# Patient Record
Sex: MALE | Race: WHITE | Employment: FULL TIME | URBAN - METROPOLITAN AREA
[De-identification: names, ages, dates, MRNs, and addresses within clinical notes are randomized per-mention and may not be internally consistent; named-entity substitution may affect disease eponyms.]

---

## 2018-10-25 ENCOUNTER — OFFICE VISIT (OUTPATIENT)
Dept: INTERNAL MEDICINE CLINIC | Facility: CLINIC | Age: 33
End: 2018-10-25
Payer: COMMERCIAL

## 2018-10-25 DIAGNOSIS — L70.9 ACNE, UNSPECIFIED ACNE TYPE: ICD-10-CM

## 2018-10-25 DIAGNOSIS — R53.83 LACK OF ENERGY: ICD-10-CM

## 2018-10-25 DIAGNOSIS — N52.9 ERECTILE DYSFUNCTION, UNSPECIFIED ERECTILE DYSFUNCTION TYPE: Primary | ICD-10-CM

## 2018-10-25 DIAGNOSIS — E66.01 MORBID OBESITY (HCC): ICD-10-CM

## 2018-10-25 DIAGNOSIS — R45.86 MOOD SWINGS: ICD-10-CM

## 2018-10-25 DIAGNOSIS — Z00.00 PREVENTATIVE HEALTH CARE: ICD-10-CM

## 2018-10-25 PROCEDURE — 99204 OFFICE O/P NEW MOD 45 MIN: CPT | Performed by: INTERNAL MEDICINE

## 2018-10-25 PROCEDURE — 90471 IMMUNIZATION ADMIN: CPT | Performed by: INTERNAL MEDICINE

## 2018-10-25 PROCEDURE — 90686 IIV4 VACC NO PRSV 0.5 ML IM: CPT | Performed by: INTERNAL MEDICINE

## 2018-10-25 NOTE — PATIENT INSTRUCTIONS
- Get blood work done.   It needs to be done at 8 AM.  You should be fasting (at least 8 hours, water and medications only)  - Follow up with Dermatology for your acne, facial issues, chest lesion.  - Based on lab results, we may consider starting you on me

## 2018-10-25 NOTE — PROGRESS NOTES
Jeffry Bacon is a 34year old male. HPI:   Patient presents with:  Erectile Dysfuntion: ongoin   Acne: on thighs  Patient is a new patient, here to establish care. Chronic issues:  He has been dealing with erectile dysfunction. It is a chronic issue. smoked. he has never used smokeless tobacco. He reports that he drinks alcohol. He reports that he does not use drugs. Wt Readings from Last 6 Encounters:  10/25/18 : (!) 383 lb 8 oz  11/30/13 : (!) 362 lb 9.6 oz    EXAM:   /80   Pulse 96   Temp 97. display  The patient indicates understanding of these issues and agrees to the plan. Return to clinic time dependent on lab results.     Jenny Holloway MD

## 2018-10-26 VITALS
DIASTOLIC BLOOD PRESSURE: 80 MMHG | HEART RATE: 96 BPM | RESPIRATION RATE: 20 BRPM | SYSTOLIC BLOOD PRESSURE: 120 MMHG | TEMPERATURE: 98 F | WEIGHT: 315 LBS | HEIGHT: 74 IN | BODY MASS INDEX: 40.43 KG/M2

## 2018-10-26 PROBLEM — N20.0 KIDNEY STONE: Status: ACTIVE | Noted: 2018-05-22

## 2018-10-26 PROBLEM — L70.9 ACNE: Status: ACTIVE | Noted: 2018-10-26

## 2018-10-26 PROBLEM — N20.0 KIDNEY STONE: Status: RESOLVED | Noted: 2018-05-22 | Resolved: 2018-10-26

## 2018-10-26 PROBLEM — E66.01 MORBID OBESITY (HCC): Status: ACTIVE | Noted: 2018-10-26

## 2018-10-26 PROBLEM — N52.9 ERECTILE DYSFUNCTION: Status: ACTIVE | Noted: 2018-10-26

## 2018-10-27 ENCOUNTER — LAB ENCOUNTER (OUTPATIENT)
Dept: LAB | Age: 33
End: 2018-10-27
Attending: INTERNAL MEDICINE
Payer: COMMERCIAL

## 2018-10-27 DIAGNOSIS — R53.83 LACK OF ENERGY: ICD-10-CM

## 2018-10-27 DIAGNOSIS — N52.9 ERECTILE DYSFUNCTION, UNSPECIFIED ERECTILE DYSFUNCTION TYPE: ICD-10-CM

## 2018-10-27 DIAGNOSIS — Z00.00 PREVENTATIVE HEALTH CARE: ICD-10-CM

## 2018-10-27 DIAGNOSIS — R45.86 MOOD SWINGS: ICD-10-CM

## 2018-10-27 PROCEDURE — 84402 ASSAY OF FREE TESTOSTERONE: CPT | Performed by: INTERNAL MEDICINE

## 2018-10-27 PROCEDURE — 36415 COLL VENOUS BLD VENIPUNCTURE: CPT | Performed by: INTERNAL MEDICINE

## 2018-10-27 PROCEDURE — 84403 ASSAY OF TOTAL TESTOSTERONE: CPT | Performed by: INTERNAL MEDICINE

## 2018-10-27 PROCEDURE — 80061 LIPID PANEL: CPT | Performed by: INTERNAL MEDICINE

## 2018-10-27 PROCEDURE — 82306 VITAMIN D 25 HYDROXY: CPT | Performed by: INTERNAL MEDICINE

## 2018-10-27 PROCEDURE — 83036 HEMOGLOBIN GLYCOSYLATED A1C: CPT | Performed by: INTERNAL MEDICINE

## 2018-10-27 PROCEDURE — 80050 GENERAL HEALTH PANEL: CPT | Performed by: INTERNAL MEDICINE

## 2018-10-29 DIAGNOSIS — E55.9 VITAMIN D DEFICIENCY: Primary | ICD-10-CM

## 2018-10-29 RX ORDER — ERGOCALCIFEROL 1.25 MG/1
50000 CAPSULE ORAL WEEKLY
Qty: 12 CAPSULE | Refills: 1 | Status: SHIPPED | OUTPATIENT
Start: 2018-10-29 | End: 2019-01-05

## 2018-11-02 ENCOUNTER — PATIENT MESSAGE (OUTPATIENT)
Dept: INTERNAL MEDICINE CLINIC | Facility: CLINIC | Age: 33
End: 2018-11-02

## 2018-11-02 NOTE — TELEPHONE ENCOUNTER
From: Mckay Tian  To: Harjinder Maguire MD  Sent: 11/2/2018 12:58 PM CDT  Subject: Prescription Question    I have a question about TESTOSTERONE,TOTAL AND FREE MALE resulted on 11/1/18 at 1:07 AM.    Can we have this script sent to the [address] [place] [address]

## 2018-11-05 ENCOUNTER — TELEPHONE (OUTPATIENT)
Dept: INTERNAL MEDICINE CLINIC | Facility: CLINIC | Age: 33
End: 2018-11-05

## 2018-11-05 RX ORDER — SILDENAFIL CITRATE 20 MG/1
20 TABLET ORAL DAILY PRN
Qty: 90 TABLET | Refills: 1 | Status: SHIPPED | OUTPATIENT
Start: 2018-11-05 | End: 2021-01-08 | Stop reason: ALTCHOICE

## 2018-11-05 NOTE — TELEPHONE ENCOUNTER
Rx given for Sildenafil Citrate 1 tablet daily as needed. Pt stated he discussed starting on 40 mg daily and if needed increase to 3 tablets daily. Is this correct?

## 2018-12-01 ENCOUNTER — OFFICE VISIT (OUTPATIENT)
Dept: INTERNAL MEDICINE CLINIC | Facility: CLINIC | Age: 33
End: 2018-12-01
Payer: COMMERCIAL

## 2018-12-01 VITALS
DIASTOLIC BLOOD PRESSURE: 88 MMHG | SYSTOLIC BLOOD PRESSURE: 138 MMHG | RESPIRATION RATE: 16 BRPM | BODY MASS INDEX: 39.17 KG/M2 | HEART RATE: 88 BPM | WEIGHT: 315 LBS | HEIGHT: 75 IN

## 2018-12-01 DIAGNOSIS — R53.83 LACK OF ENERGY: ICD-10-CM

## 2018-12-01 DIAGNOSIS — R94.31 ABNORMAL EKG: ICD-10-CM

## 2018-12-01 DIAGNOSIS — E66.01 MORBID OBESITY WITH BMI OF 45.0-49.9, ADULT (HCC): ICD-10-CM

## 2018-12-01 DIAGNOSIS — Z87.442 HISTORY OF KIDNEY STONES: ICD-10-CM

## 2018-12-01 DIAGNOSIS — E55.9 VITAMIN D DEFICIENCY: ICD-10-CM

## 2018-12-01 DIAGNOSIS — Z51.81 THERAPEUTIC DRUG MONITORING: Primary | ICD-10-CM

## 2018-12-01 DIAGNOSIS — E66.01 MORBID OBESITY (HCC): ICD-10-CM

## 2018-12-01 DIAGNOSIS — R45.86 MOOD SWINGS: ICD-10-CM

## 2018-12-01 PROCEDURE — 93000 ELECTROCARDIOGRAM COMPLETE: CPT | Performed by: NURSE PRACTITIONER

## 2018-12-01 PROCEDURE — 99214 OFFICE O/P EST MOD 30 MIN: CPT | Performed by: NURSE PRACTITIONER

## 2018-12-01 RX ORDER — PEN NEEDLE, DIABETIC 30 GX3/16"
1 NEEDLE, DISPOSABLE MISCELLANEOUS DAILY
Qty: 100 EACH | Refills: 1 | Status: SHIPPED | OUTPATIENT
Start: 2018-12-01 | End: 2019-05-04

## 2018-12-01 RX ORDER — LIRAGLUTIDE 6 MG/ML
1.8 INJECTION SUBCUTANEOUS DAILY
Qty: 3 PEN | Refills: 1 | Status: SHIPPED | OUTPATIENT
Start: 2018-12-01 | End: 2019-01-05

## 2018-12-01 NOTE — PROGRESS NOTES
Ike Maddox is covered by your patient's prescription insurance plan  Based on the information provided, your patient can expect to pay:  $15      Nabil Britt is not covered by the patient's prescription insurance plan at this time

## 2018-12-01 NOTE — PATIENT INSTRUCTIONS
PLAN:  Continue with medications: victoza injectable:  Week 1- 0.6mg  Week 2- 1.2mg  Week 3- 1.8mg (stay at this dose)  Stress ECHO ordered  Go to the lab for blood work  Follow up with me in 1 month  Schedule follow up appointments:  Dietitian/nutritionis home!   3. \"Calm\" or \"Headspace\" which helps with mindfulness, meditation, clarity, sleep, and jovon to your daily life.

## 2018-12-01 NOTE — PROGRESS NOTES
Patient teaching on Victoza performed. Patient demonstrated back, all questions were answered and patient verbalized understanding. Patient gave first injection in the office today.  [address]

## 2018-12-01 NOTE — PROGRESS NOTES
HISTORY OF PRESENT ILLNESS  Patient presents with:  Weight Problem: referred by wife, no previous medication, no previous programs    Zenobia Fry is a 34year old male new to our office today for initiation of medical weight loss program.  Patient presents t negative  Thyroid disease: negative  Renal disease: negative   Kidney stones: + may 2018  Liver disease: negative  Joint-related conditions:negative  Migraines/seizures: negative  Glaucoma: negative  Depression/anxiety: self diagnosed- no meds   Constipati 10/27/2018     10/27/2018    K 4.3 10/27/2018     10/27/2018    CO2 27.0 10/27/2018     Lab Results   Component Value Date     10/27/2018    A1C 5.2 10/27/2018     Lab Results   Component Value Date    CHOLEST 188 10/27/2018    TRIG 93 1 [facility] patient contract. Readiness for Lifestyle change: 6/10, Interest in Medication: 8/10, Surgery interest: 4/10. Counseled on comprehensive weight loss plan including attention to nutrition, exercise and behavior/stress management for success.  Discusse Do not eat late at night  · FITTE: ACSM recommendations (150-300 minutes/ week in active weight loss)   · Discussed the role of sleep and stress in weight management  · Weight Loss Contract reviewed and signed today 12/1/2018    Labs ordered today: shona le Look at nutrition section-- \"nutrients\" and it will break down your macros for the day (ie. Protein, carbs, fibers, sugars and fats). Try to stay within these numbers daily     2.  \"7 minute workout\" to help with exercise/activity which takes 7 minutes

## 2018-12-04 ENCOUNTER — LAB ENCOUNTER (OUTPATIENT)
Dept: LAB | Age: 33
End: 2018-12-04
Attending: NURSE PRACTITIONER
Payer: COMMERCIAL

## 2018-12-04 DIAGNOSIS — Z51.81 THERAPEUTIC DRUG MONITORING: ICD-10-CM

## 2018-12-04 DIAGNOSIS — E66.01 MORBID OBESITY WITH BMI OF 45.0-49.9, ADULT (HCC): ICD-10-CM

## 2018-12-04 PROCEDURE — 36415 COLL VENOUS BLD VENIPUNCTURE: CPT | Performed by: NURSE PRACTITIONER

## 2018-12-04 PROCEDURE — 82607 VITAMIN B-12: CPT | Performed by: NURSE PRACTITIONER

## 2018-12-05 ENCOUNTER — PATIENT MESSAGE (OUTPATIENT)
Dept: INTERNAL MEDICINE CLINIC | Facility: CLINIC | Age: 33
End: 2018-12-05

## 2018-12-05 DIAGNOSIS — E55.9 VITAMIN D DEFICIENCY: Primary | ICD-10-CM

## 2018-12-05 RX ORDER — ERGOCALCIFEROL 1.25 MG/1
50000 CAPSULE ORAL
Qty: 16 CAPSULE | Refills: 1 | Status: SHIPPED | OUTPATIENT
Start: 2018-12-06 | End: 2021-01-08 | Stop reason: ALTCHOICE

## 2018-12-05 NOTE — TELEPHONE ENCOUNTER
From: Jeffry Bacon  To: JEREMY Perdue  Sent: 12/5/2018 11:30 AM CST  Subject: Prescription Question    I was just checking to see if the vitamin d prescription was updated yet to reflect the change to twice weekly.  Also I have scheduled my echo for

## 2018-12-14 ENCOUNTER — HOSPITAL ENCOUNTER (OUTPATIENT)
Dept: CV DIAGNOSTICS | Facility: HOSPITAL | Age: 33
Discharge: HOME | End: 2018-12-14
Attending: NURSE PRACTITIONER
Payer: COMMERCIAL

## 2018-12-14 DIAGNOSIS — E66.01 MORBID OBESITY WITH BMI OF 45.0-49.9, ADULT (HCC): ICD-10-CM

## 2018-12-14 DIAGNOSIS — Z51.81 THERAPEUTIC DRUG MONITORING: ICD-10-CM

## 2018-12-14 DIAGNOSIS — R94.31 ABNORMAL EKG: ICD-10-CM

## 2018-12-14 PROCEDURE — 93018 CV STRESS TEST I&R ONLY: CPT | Performed by: NURSE PRACTITIONER

## 2018-12-14 PROCEDURE — 93350 STRESS TTE ONLY: CPT | Performed by: NURSE PRACTITIONER

## 2018-12-14 PROCEDURE — 93017 CV STRESS TEST TRACING ONLY: CPT | Performed by: NURSE PRACTITIONER

## 2018-12-18 NOTE — PROGRESS NOTES
Stress ECHO was abnormal. This is something that if I find if abnormal I would recommend following up with cardiologist and would recommend doing further testing. I can recommend Cardiologist (Dr. Stacie Crane) or your PCP can as well. . If so, edy

## 2019-01-05 ENCOUNTER — OFFICE VISIT (OUTPATIENT)
Dept: INTERNAL MEDICINE CLINIC | Facility: CLINIC | Age: 34
End: 2019-01-05
Payer: COMMERCIAL

## 2019-01-05 VITALS
DIASTOLIC BLOOD PRESSURE: 80 MMHG | HEART RATE: 106 BPM | RESPIRATION RATE: 16 BRPM | BODY MASS INDEX: 39.17 KG/M2 | HEIGHT: 75 IN | WEIGHT: 315 LBS | SYSTOLIC BLOOD PRESSURE: 130 MMHG

## 2019-01-05 DIAGNOSIS — Z87.442 HISTORY OF KIDNEY STONES: ICD-10-CM

## 2019-01-05 DIAGNOSIS — Z51.81 THERAPEUTIC DRUG MONITORING: Primary | ICD-10-CM

## 2019-01-05 DIAGNOSIS — E66.01 MORBID OBESITY WITH BMI OF 45.0-49.9, ADULT (HCC): ICD-10-CM

## 2019-01-05 DIAGNOSIS — E55.9 VITAMIN D DEFICIENCY: ICD-10-CM

## 2019-01-05 DIAGNOSIS — E66.01 MORBID OBESITY (HCC): ICD-10-CM

## 2019-01-05 PROCEDURE — 99214 OFFICE O/P EST MOD 30 MIN: CPT | Performed by: NURSE PRACTITIONER

## 2019-01-05 RX ORDER — LIRAGLUTIDE 6 MG/ML
1.8 INJECTION SUBCUTANEOUS DAILY
Qty: 3 PEN | Refills: 2 | Status: SHIPPED | OUTPATIENT
Start: 2019-01-05 | End: 2019-04-08

## 2019-01-05 RX ORDER — METFORMIN HYDROCHLORIDE 750 MG/1
750 TABLET, EXTENDED RELEASE ORAL 2 TIMES DAILY WITH MEALS
Qty: 60 TABLET | Refills: 4 | Status: SHIPPED | OUTPATIENT
Start: 2019-01-05 | End: 2019-07-02

## 2019-01-05 NOTE — PATIENT INSTRUCTIONS
Keep up the great work!! #5 lbs of weight loss!     PLAN:  Continue with medications: victoza 1.8mg and add metformin 750mg 1 tablet in the morning and 1 tablet before dinner  See Cardiologist   Follow up with me in 1 month  Schedule follow up appointments do at home! 3. \"Calm\" or \"Headspace\" which helps with mindfulness, meditation, clarity, sleep, and jovon to your daily life.

## 2019-02-02 ENCOUNTER — HOSPITAL ENCOUNTER (OUTPATIENT)
Age: 34
Discharge: HOME | End: 2019-02-02
Payer: COMMERCIAL

## 2019-02-02 VITALS
BODY MASS INDEX: 39.17 KG/M2 | HEIGHT: 75 IN | WEIGHT: 315 LBS | TEMPERATURE: 98 F | SYSTOLIC BLOOD PRESSURE: 149 MMHG | HEART RATE: 88 BPM | RESPIRATION RATE: 18 BRPM | DIASTOLIC BLOOD PRESSURE: 85 MMHG | OXYGEN SATURATION: 99 %

## 2019-02-02 DIAGNOSIS — K12.2 UVULITIS: Primary | ICD-10-CM

## 2019-02-02 LAB — POCT RAPID STREP: NEGATIVE

## 2019-02-02 PROCEDURE — 87081 CULTURE SCREEN ONLY: CPT | Performed by: NURSE PRACTITIONER

## 2019-02-02 PROCEDURE — 87430 STREP A AG IA: CPT | Performed by: NURSE PRACTITIONER

## 2019-02-02 PROCEDURE — 99214 OFFICE O/P EST MOD 30 MIN: CPT

## 2019-02-02 PROCEDURE — 99204 OFFICE O/P NEW MOD 45 MIN: CPT

## 2019-02-02 RX ORDER — AMOXICILLIN AND CLAVULANATE POTASSIUM 875; 125 MG/1; MG/1
1 TABLET, FILM COATED ORAL 2 TIMES DAILY
Qty: 20 TABLET | Refills: 0 | Status: SHIPPED | OUTPATIENT
Start: 2019-02-02 | End: 2019-02-12

## 2019-02-02 NOTE — ED PROVIDER NOTES
Patient Seen in: [facility]    History   Patient presents with:  Sore Throat    Stated Complaint: sore throat    57-year-old male who presents to the immediate care with complaints of a sore throat.   Patient has had a recent cho SpO2 99 %   O2 Device None (Room air)       Current:/85   Pulse 88   Temp 98 °F (36.7 °C) (Oral)   Resp 18   Ht 190.5 cm (6' 3\")   Wt (!) 167.8 kg   SpO2 99%   BMI 46.25 kg/m²         Physical Exam   Constitutional: He is oriented to person, place were answered at this time.             Disposition and Plan     Clinical Impression:  Uvulitis  (primary encounter diagnosis)    Disposition:  Discharge  2/2/2019 12:15 pm    Follow-up:  Dinah Mendez MD  [address] [place] [address]  [phone]

## 2019-02-09 ENCOUNTER — OFFICE VISIT (OUTPATIENT)
Dept: INTERNAL MEDICINE CLINIC | Facility: CLINIC | Age: 34
End: 2019-02-09
Payer: COMMERCIAL

## 2019-02-09 VITALS
HEART RATE: 88 BPM | HEIGHT: 75 IN | BODY MASS INDEX: 39.17 KG/M2 | WEIGHT: 315 LBS | RESPIRATION RATE: 16 BRPM | SYSTOLIC BLOOD PRESSURE: 128 MMHG | DIASTOLIC BLOOD PRESSURE: 86 MMHG

## 2019-02-09 DIAGNOSIS — Z51.81 THERAPEUTIC DRUG MONITORING: Primary | ICD-10-CM

## 2019-02-09 DIAGNOSIS — F32.89 OTHER DEPRESSION: ICD-10-CM

## 2019-02-09 DIAGNOSIS — E55.9 VITAMIN D DEFICIENCY: ICD-10-CM

## 2019-02-09 DIAGNOSIS — E66.01 MORBID OBESITY (HCC): ICD-10-CM

## 2019-02-09 PROCEDURE — 99213 OFFICE O/P EST LOW 20 MIN: CPT | Performed by: NURSE PRACTITIONER

## 2019-02-09 RX ORDER — FLUOXETINE HYDROCHLORIDE 20 MG/1
20 CAPSULE ORAL DAILY
Qty: 30 CAPSULE | Refills: 1 | Status: SHIPPED | OUTPATIENT
Start: 2019-02-09 | End: 2019-04-08

## 2019-02-09 NOTE — PROGRESS NOTES
HISTORY OF PRESENT ILLNESS  Patient presents with:  Weight Check: lost 6 pounds    Rafia Sands is a 34year old male here for follow up with medical weight loss program for the treatment of overweight, obesity, or morbid obesity.  Patient has lost -#6  lbs meals/week    Social hx and lifestyle reviewed:    Work: desk job [email or web address]   Marital status:    Support: yes  Tobacco use: none  ETOH use:   rare  Supplements: high dose vitamin d   Stress level: 3/10  Sleep hours and integrity:  6-7 Hours pe 10/27/2018     10/27/2018    K 4.3 10/27/2018     10/27/2018    CO2 27.0 10/27/2018     Lab Results   Component Value Date     10/27/2018    A1C 5.2 10/27/2018     Lab Results   Component Value Date    CHOLEST 188 10/27/2018    TRIG 93 1 Reviewed  [facility] patient contract. Readiness for Lifestyle change: 6/10, Interest in Medication: 8/10, Surgery interest: 4/10.     Counseled on comprehensive weight loss plan including attention to nutrition, exercise and behavior/stress management for suc water 65 oz of water per day  - Do not drink your calories (no soda, juice, high calorie coffee drinks, limit alcohol)  - Do not eat late at night  · FITTE: ACSM recommendations (150-300 minutes/ week in active weight loss)   · Discussed the role of sleep calorie number per day)                   ** Daily INPUT> Look at nutrition section-- \"nutrients\" and it will break down your macros for the day (ie. Protein, carbs, fibers, sugars and fats). Try to stay within these numbers daily     2.  \"7 minute worko

## 2019-02-09 NOTE — PATIENT INSTRUCTIONS
PLAN:  Continue with medications: victoza 1.8mg, fluxeotine  metformin 750mg   Daniel.  For nuclear scan in the next month  Follow up with me in 1 month  Schedule follow up appointments:  Dietitian/nutritionist      Please try to work on the following dietary meditation, clarity, sleep, and jovon to your daily life.

## 2019-02-26 ENCOUNTER — OFFICE VISIT (OUTPATIENT)
Dept: INTERNAL MEDICINE CLINIC | Facility: CLINIC | Age: 34
End: 2019-02-26
Payer: COMMERCIAL

## 2019-02-26 VITALS — WEIGHT: 315 LBS | BODY MASS INDEX: 46 KG/M2

## 2019-02-26 DIAGNOSIS — E66.01 OBESITY, CLASS III, BMI 40-49.9 (MORBID OBESITY) (HCC): Primary | ICD-10-CM

## 2019-02-26 DIAGNOSIS — R74.8 ELEVATED LIVER ENZYMES: ICD-10-CM

## 2019-02-26 PROCEDURE — 97802 MEDICAL NUTRITION INDIV IN: CPT | Performed by: DIETITIAN, REGISTERED

## 2019-02-26 NOTE — PROGRESS NOTES
INITIAL OUTPATIENT NUTRITION CONSULTATION    Nutrition Assessment    Medical Diagnosis: Obesity    Client Age and Gender: 34year old  male  Marital Status and Occupation:  to [facility] [employer] pt.    with [employer], desk job    Problem List as of 2/26/2019 >=130mg/dL         HDL Cholesterol   Date Value Ref Range Status   10/27/2018 49 40 - 59 mg/dL Final     Comment:       Interpretive Information:   An HDL cholesterol <40 mg/dL is low and constitutes a coronary heart disease risk factor.  An HDL choleste variety of foods including vegetables and lean proteins. Eats poor quality foods out of convenience and not prioritizing healthy eating.   He and his wife go to stores with groceries weekly but don't buy food, only home care items, as they know that grocer

## 2019-03-11 ENCOUNTER — OFFICE VISIT (OUTPATIENT)
Dept: INTERNAL MEDICINE CLINIC | Facility: CLINIC | Age: 34
End: 2019-03-11

## 2019-03-11 VITALS
HEART RATE: 90 BPM | SYSTOLIC BLOOD PRESSURE: 132 MMHG | RESPIRATION RATE: 16 BRPM | WEIGHT: 315 LBS | HEIGHT: 75 IN | DIASTOLIC BLOOD PRESSURE: 84 MMHG | BODY MASS INDEX: 39.17 KG/M2

## 2019-03-11 DIAGNOSIS — E66.01 OBESITY, CLASS III, BMI 40-49.9 (MORBID OBESITY) (HCC): ICD-10-CM

## 2019-03-11 DIAGNOSIS — E55.9 VITAMIN D DEFICIENCY: ICD-10-CM

## 2019-03-11 DIAGNOSIS — Z51.81 THERAPEUTIC DRUG MONITORING: Primary | ICD-10-CM

## 2019-03-11 DIAGNOSIS — F32.89 OTHER DEPRESSION: ICD-10-CM

## 2019-03-11 PROCEDURE — 99213 OFFICE O/P EST LOW 20 MIN: CPT | Performed by: NURSE PRACTITIONER

## 2019-03-11 NOTE — PROGRESS NOTES
HISTORY OF PRESENT ILLNESS  Patient presents with:  Weight Check: lost 5 pounds    Lisa Gonzalez is a 34year old male here for follow up with medical weight loss program for the treatment of overweight, obesity, or morbid obesity.  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 Counseled on comprehensive weight loss plan including attention to nutrition, exercise and behavior/stress management for success.  Discussed first goal of weight loss 5% i calories (no soda, juice, high calorie coffee drinks, limit alcohol)  - Do not eat late at night  · FITTE: ACSM recommendations (150-300 minutes/ week in active weight loss)   · Discussed the role of sleep and stress in weight management  · Weight Loss Con break down your macros for the day (ie. Protein, carbs, fibers, sugars and fats). Try to stay within these numbers daily     2. \"7 minute workout\" to help with exercise/activity which takes 7 minutes of your day and that you can do at home! 3.  \"Calm\"

## 2019-03-11 NOTE — PATIENT INSTRUCTIONS
Keep up the great work! !     PLAN:  Continue with medications  Follow up with me in 1 month  Schedule follow up appointments:  Dietitian/nutritionist      Please try to work on the following dietary changes:  1.  Drink lots of water and cut down on soda/jui

## 2019-04-08 ENCOUNTER — OFFICE VISIT (OUTPATIENT)
Dept: INTERNAL MEDICINE CLINIC | Facility: CLINIC | Age: 34
End: 2019-04-08

## 2019-04-08 VITALS
RESPIRATION RATE: 16 BRPM | SYSTOLIC BLOOD PRESSURE: 120 MMHG | BODY MASS INDEX: 39.17 KG/M2 | WEIGHT: 315 LBS | DIASTOLIC BLOOD PRESSURE: 82 MMHG | HEIGHT: 75 IN | HEART RATE: 86 BPM

## 2019-04-08 DIAGNOSIS — F32.89 OTHER DEPRESSION: ICD-10-CM

## 2019-04-08 DIAGNOSIS — E55.9 VITAMIN D DEFICIENCY: ICD-10-CM

## 2019-04-08 DIAGNOSIS — E66.01 MORBID OBESITY (HCC): ICD-10-CM

## 2019-04-08 DIAGNOSIS — R74.8 ELEVATED LIVER ENZYMES: ICD-10-CM

## 2019-04-08 DIAGNOSIS — Z51.81 THERAPEUTIC DRUG MONITORING: Primary | ICD-10-CM

## 2019-04-08 DIAGNOSIS — Z71.3 DIETARY COUNSELING: ICD-10-CM

## 2019-04-08 PROCEDURE — 99213 OFFICE O/P EST LOW 20 MIN: CPT | Performed by: NURSE PRACTITIONER

## 2019-04-08 PROCEDURE — 99401 PREV MED CNSL INDIV APPRX 15: CPT | Performed by: NURSE PRACTITIONER

## 2019-04-08 RX ORDER — FLUOXETINE HYDROCHLORIDE 20 MG/1
20 CAPSULE ORAL DAILY
Qty: 30 CAPSULE | Refills: 1 | Status: CANCELLED | OUTPATIENT
Start: 2019-04-08

## 2019-04-08 RX ORDER — FLUOXETINE HYDROCHLORIDE 40 MG/1
40 CAPSULE ORAL DAILY
Qty: 30 CAPSULE | Refills: 3 | Status: SHIPPED | OUTPATIENT
Start: 2019-04-08 | End: 2019-07-02

## 2019-04-08 RX ORDER — LIRAGLUTIDE 6 MG/ML
1.8 INJECTION SUBCUTANEOUS DAILY
Qty: 3 PEN | Refills: 2 | Status: SHIPPED | OUTPATIENT
Start: 2019-04-08 | End: 2019-07-02

## 2019-04-08 NOTE — PROGRESS NOTES
Preventative Counseling for Diet and Exercise     /82   Pulse 86   Resp 16   Ht 75\"   Wt (!) 360 lb   BMI 45.00 kg/m²   Body mass index is 45 kg/m². Wt Readings from Last 6 Encounters:  04/08/19 : (!) 360 lb  03/11/19 : (!) 360 lb  02/26/19 : Sharri Castro )

## 2019-04-08 NOTE — PATIENT INSTRUCTIONS
PLAN:  Continue with medications:  prozac 40mg, metformin, and victoza 1.8mg  Get nuclear stress echo   Follow up with me in 1 month  Schedule follow up appointments:  Dietitian/nutritionist      Please try to work on the following dietary changes:  1.  Dri sleep, and jovon to your daily life.

## 2019-04-08 NOTE — PROGRESS NOTES
HISTORY OF PRESENT ILLNESS  Patient presents with:  Weight Check: lost 1 pound    Jyoti Huizar is a 34year old male here for follow up with medical weight loss program for the treatment of overweight, obesity, or morbid obesity.  Patient has lost -#1  lbs s disorders:negative  Diabetes: negative  Thyroid disease: negative  Renal disease: negative   Kidney stones: + may 2018  Liver disease: negative  Joint-related conditions:negative  Migraines/seizures: negative  Glaucoma: negative  Depression/anxiety: self d Lab Results   Component Value Date    B12 521 12/04/2018     Lab Results   Component Value Date    VITD 9.9 (L) 10/27/2018         Current Outpatient Medications on File Prior to Visit:  MetFORMIN HCl  MG Oral Tablet 24 Hr Take 1 tablet (750 mg t nl intervals, no acute ST changes, [id number]. abnormal EKG.  No previous EKG to compare- ECHO was abnormal--> referral to see CARDS    PLAN   Continue w/ recommendations from nutritionist (start using MFP)  Start to increase exercising/activity 3 days a week ( stress echo   Follow up with me in 1 month  Schedule follow up appointments:  Dietitian/nutritionist      Please try to work on the following dietary changes:  1. Drink lots of water and cut down on soda/juice consumption if soda/juice drinker  2.  Eat mauricio 5/6/2019). Patient verbalizes understanding.     Terrell York, APRN

## 2019-04-27 ENCOUNTER — HOSPITAL ENCOUNTER (OUTPATIENT)
Dept: CV DIAGNOSTICS | Facility: HOSPITAL | Age: 34
Discharge: HOME | End: 2019-04-27
Attending: INTERNAL MEDICINE
Payer: COMMERCIAL

## 2019-04-27 DIAGNOSIS — E66.01 MORBID OBESITY WITH BMI OF 45.0-49.9, ADULT (HCC): ICD-10-CM

## 2019-04-27 DIAGNOSIS — Z13.220 SCREENING, LIPID: ICD-10-CM

## 2019-04-27 DIAGNOSIS — R94.39 ABNORMAL STRESS ECHO: ICD-10-CM

## 2019-04-27 PROCEDURE — 78452 HT MUSCLE IMAGE SPECT MULT: CPT | Performed by: INTERNAL MEDICINE

## 2019-04-27 PROCEDURE — 93018 CV STRESS TEST I&R ONLY: CPT | Performed by: INTERNAL MEDICINE

## 2019-04-27 PROCEDURE — 93017 CV STRESS TEST TRACING ONLY: CPT | Performed by: INTERNAL MEDICINE

## 2019-05-01 DIAGNOSIS — E55.9 VITAMIN D DEFICIENCY: ICD-10-CM

## 2019-05-02 RX ORDER — ERGOCALCIFEROL 1.25 MG/1
CAPSULE ORAL
Qty: 16 CAPSULE | Refills: 0 | OUTPATIENT
Start: 2019-05-02

## 2019-05-02 NOTE — TELEPHONE ENCOUNTER
Patient informed he needs to get lab done so we can see if he needs Vitamin D to continue. He will go soon.

## 2019-05-02 NOTE — TELEPHONE ENCOUNTER
Please tell patient I put in vitamin d lab and will see if it's too low and and if so we can give more high dose vitamin d

## 2019-05-04 ENCOUNTER — OFFICE VISIT (OUTPATIENT)
Dept: INTERNAL MEDICINE CLINIC | Facility: CLINIC | Age: 34
End: 2019-05-04
Payer: COMMERCIAL

## 2019-05-04 ENCOUNTER — LAB ENCOUNTER (OUTPATIENT)
Dept: LAB | Age: 34
End: 2019-05-04
Attending: NURSE PRACTITIONER
Payer: COMMERCIAL

## 2019-05-04 VITALS
DIASTOLIC BLOOD PRESSURE: 70 MMHG | BODY MASS INDEX: 39.17 KG/M2 | RESPIRATION RATE: 14 BRPM | SYSTOLIC BLOOD PRESSURE: 110 MMHG | HEART RATE: 70 BPM | HEIGHT: 75 IN | WEIGHT: 315 LBS

## 2019-05-04 VITALS — WEIGHT: 315 LBS | BODY MASS INDEX: 45 KG/M2

## 2019-05-04 DIAGNOSIS — F32.89 OTHER DEPRESSION: ICD-10-CM

## 2019-05-04 DIAGNOSIS — E55.9 VITAMIN D DEFICIENCY: ICD-10-CM

## 2019-05-04 DIAGNOSIS — R74.8 ELEVATED LIVER ENZYMES: ICD-10-CM

## 2019-05-04 DIAGNOSIS — Z51.81 THERAPEUTIC DRUG MONITORING: Primary | ICD-10-CM

## 2019-05-04 DIAGNOSIS — E66.01 OBESITY, CLASS III, BMI 40-49.9 (MORBID OBESITY) (HCC): Primary | ICD-10-CM

## 2019-05-04 DIAGNOSIS — E66.01 OBESITY, CLASS III, BMI 40-49.9 (MORBID OBESITY) (HCC): ICD-10-CM

## 2019-05-04 PROCEDURE — 97803 MED NUTRITION INDIV SUBSEQ: CPT | Performed by: DIETITIAN, REGISTERED

## 2019-05-04 PROCEDURE — 82306 VITAMIN D 25 HYDROXY: CPT | Performed by: NURSE PRACTITIONER

## 2019-05-04 PROCEDURE — 99214 OFFICE O/P EST MOD 30 MIN: CPT | Performed by: NURSE PRACTITIONER

## 2019-05-04 RX ORDER — PEN NEEDLE, DIABETIC 30 GX3/16"
1 NEEDLE, DISPOSABLE MISCELLANEOUS DAILY
Qty: 100 EACH | Refills: 1 | Status: SHIPPED | OUTPATIENT
Start: 2019-05-04 | End: 2020-03-28

## 2019-05-04 RX ORDER — PHENTERMINE HYDROCHLORIDE 37.5 MG/1
37.5 TABLET ORAL
Qty: 30 TABLET | Refills: 0 | Status: SHIPPED | OUTPATIENT
Start: 2019-05-04 | End: 2019-07-02

## 2019-05-04 NOTE — PROGRESS NOTES
HISTORY OF PRESENT ILLNESS  Patient presents with:  Weight Check: down Julee Barton is a 30year old male here for follow up with medical weight loss program for the treatment of overweight, obesity, or morbid obesity.  Patient has lost -#4  lbs since L dose vitamin d   Stress level: 3/10  Sleep hours and integrity:  6-7 Hours per night    MEDICAL HISTORY  PMH reviewed:   Cardiac disorders:negative  Diabetes: negative  Thyroid disease: negative  Renal disease: negative   Kidney stones: + may 2018  Liver d  (H) 10/27/2018    VLDL 19 10/27/2018    NONHDLC 139 (H) 10/27/2018     Lab Results   Component Value Date    B12 521 12/04/2018     Lab Results   Component Value Date    VITD 9.9 (L) 10/27/2018         Current Outpatient Medications on File Prior t cardiology Dr. Rahat Schmidt)    PLAN   Continue w/ recommendations from nutritionist (start using MFP)  Start to increase exercising/activity 3 days a week (1 w/  and 2 without) as tolerated   Decrease carbs, increase protein, decrease soda & tea, d lbs of weight loss!      PLAN:  Continue with medications: phentermine 37.5mg (cut 1/2 tablet in half for 4 days) and then take the full dose, metformin, victoza 1.8mg and prozac  Vitamin d lab  Follow up with me in 1 month  Schedule follow up appointments do at home! 3. \"Calm\" or \"Headspace\" which helps with mindfulness, meditation, clarity, sleep, and jovon to your daily life. Return in about 1 month (around 6/4/2019) for weight mangement. Patient verbalizes understanding.     Rich Rhoades

## 2019-05-04 NOTE — PROGRESS NOTES
FOLLOW UP NUTRITION CONSULTATION    Nutrition Assessment    Number of consultations with dietitian: 2    Height:  Ht Readings from Last 1 Encounters:  04/08/19 : 75\"      Weight:   Wt Readings from Last 2 Encounters:  05/04/19 : (!) 356 lb  04/08/19 : suggested. Goals:  · Continue meal planning and grocery shopping  · Have a \"cheat\" food on weekends rather than a cheat day  · Increase exercise      Monitoring/Evaluation: Patient scheduled to return to [facility]. Follow up as needed.

## 2019-05-05 NOTE — PROGRESS NOTES
Vitamin d looks so much better, now within normal range. I would just continue to take over the counter 1,000 iu daily of vitamin d to keep it up.

## 2019-07-02 ENCOUNTER — OFFICE VISIT (OUTPATIENT)
Dept: INTERNAL MEDICINE CLINIC | Facility: CLINIC | Age: 34
End: 2019-07-02

## 2019-07-02 VITALS
WEIGHT: 315 LBS | SYSTOLIC BLOOD PRESSURE: 108 MMHG | BODY MASS INDEX: 39.17 KG/M2 | RESPIRATION RATE: 16 BRPM | DIASTOLIC BLOOD PRESSURE: 72 MMHG | HEART RATE: 90 BPM | HEIGHT: 75 IN

## 2019-07-02 DIAGNOSIS — F32.89 OTHER DEPRESSION: ICD-10-CM

## 2019-07-02 DIAGNOSIS — E66.01 MORBID OBESITY (HCC): ICD-10-CM

## 2019-07-02 DIAGNOSIS — Z51.81 THERAPEUTIC DRUG MONITORING: Primary | ICD-10-CM

## 2019-07-02 DIAGNOSIS — E55.9 VITAMIN D DEFICIENCY: ICD-10-CM

## 2019-07-02 PROCEDURE — 99214 OFFICE O/P EST MOD 30 MIN: CPT | Performed by: NURSE PRACTITIONER

## 2019-07-02 RX ORDER — PHENTERMINE HYDROCHLORIDE 37.5 MG/1
37.5 TABLET ORAL
Qty: 30 TABLET | Refills: 0 | Status: SHIPPED | OUTPATIENT
Start: 2019-07-02 | End: 2021-01-08 | Stop reason: ALTCHOICE

## 2019-07-02 RX ORDER — LIRAGLUTIDE 6 MG/ML
1.8 INJECTION SUBCUTANEOUS DAILY
Qty: 3 PEN | Refills: 2 | Status: SHIPPED | OUTPATIENT
Start: 2019-07-02 | End: 2021-01-08 | Stop reason: ALTCHOICE

## 2019-07-02 NOTE — PROGRESS NOTES
HISTORY OF PRESENT ILLNESS  Patient presents with:  Weight Check: up 13 lbs    Dione Ricci is a 30year old male here for follow up with medical weight loss program for the treatment of overweight, obesity, or morbid obesity.  Patient has gained -#13  lbs s rare  Supplements: high dose vitamin d   Stress level: 3/10  Sleep hours and integrity:  6-7 Hours per night    MEDICAL HISTORY  PMH reviewed:   Cardiac disorders:negative  Diabetes: negative  Thyroid disease: negative  Renal disease: negative   Kidney sto HDL 49 10/27/2018     (H) 10/27/2018    VLDL 19 10/27/2018    NONHDLC 139 (H) 10/27/2018     Lab Results   Component Value Date    B12 521 12/04/2018     Lab Results   Component Value Date    VITD 43.2 05/04/2019         Current Outpatient [facility] cardiology Dr. Tea Keita)    PLAN   Continue w/ recommendations from nutritionist (start using MFP)  Start to increase exercising/activity 3 days a week (1 w/  and 2 without) as tolerated   Decrease carbs, increase protein, decrease soda & tea, d

## 2019-07-02 NOTE — PATIENT INSTRUCTIONS
PLAN:  Continue with medications: phentermine 37.5mg and victoza   Follow up with me in 1 month  Schedule follow up appointments:  Dietitian/nutritionist      Please try to work on the following dietary changes:  1.  Drink lots of water and cut down on soda

## 2020-03-28 ENCOUNTER — E-ADVICE (OUTPATIENT)
Dept: FAMILY MEDICINE | Age: 35
End: 2020-03-28

## 2020-03-28 ENCOUNTER — V-VISIT (OUTPATIENT)
Dept: FAMILY MEDICINE | Age: 35
End: 2020-03-28

## 2020-03-28 ENCOUNTER — APPOINTMENT (OUTPATIENT)
Dept: GENERAL RADIOLOGY | Facility: HOSPITAL | Age: 35
End: 2020-03-28
Attending: PHYSICIAN ASSISTANT
Payer: COMMERCIAL

## 2020-03-28 ENCOUNTER — TELEPHONE (OUTPATIENT)
Dept: INTERNAL MEDICINE CLINIC | Facility: CLINIC | Age: 35
End: 2020-03-28

## 2020-03-28 ENCOUNTER — HOSPITAL ENCOUNTER (EMERGENCY)
Facility: HOSPITAL | Age: 35
Discharge: HOME | End: 2020-03-28
Attending: EMERGENCY MEDICINE
Payer: COMMERCIAL

## 2020-03-28 VITALS
WEIGHT: 315 LBS | HEIGHT: 75 IN | BODY MASS INDEX: 39.17 KG/M2 | TEMPERATURE: 98 F | OXYGEN SATURATION: 99 % | RESPIRATION RATE: 14 BRPM | DIASTOLIC BLOOD PRESSURE: 78 MMHG | HEART RATE: 90 BPM | SYSTOLIC BLOOD PRESSURE: 124 MMHG

## 2020-03-28 DIAGNOSIS — J06.9 VIRAL UPPER RESPIRATORY INFECTION: ICD-10-CM

## 2020-03-28 DIAGNOSIS — Z20.822 EXPOSURE TO COVID-19 VIRUS: Primary | ICD-10-CM

## 2020-03-28 DIAGNOSIS — Z20.822 SUSPECTED 2019 NOVEL CORONAVIRUS INFECTION: Primary | ICD-10-CM

## 2020-03-28 LAB
ALBUMIN SERPL-MCNC: 3.7 G/DL (ref 3.4–5)
ALBUMIN/GLOB SERPL: 0.9 {RATIO} (ref 1–2)
ALP LIVER SERPL-CCNC: 67 U/L (ref 45–117)
ALT SERPL-CCNC: 115 U/L (ref 16–61)
ANION GAP SERPL CALC-SCNC: 4 MMOL/L (ref 0–18)
AST SERPL-CCNC: 66 U/L (ref 15–37)
BASOPHILS # BLD AUTO: 0.03 X10(3) UL (ref 0–0.2)
BASOPHILS NFR BLD AUTO: 0.5 %
BILIRUB SERPL-MCNC: 0.6 MG/DL (ref 0.1–2)
BUN BLD-MCNC: 7 MG/DL (ref 7–18)
BUN/CREAT SERPL: 7.8 (ref 10–20)
CALCIUM BLD-MCNC: 9 MG/DL (ref 8.5–10.1)
CHLORIDE SERPL-SCNC: 108 MMOL/L (ref 98–112)
CO2 SERPL-SCNC: 27 MMOL/L (ref 21–32)
CREAT BLD-MCNC: 0.9 MG/DL (ref 0.7–1.3)
DEPRECATED RDW RBC AUTO: 41.1 FL (ref 35.1–46.3)
EOSINOPHIL # BLD AUTO: 0.03 X10(3) UL (ref 0–0.7)
EOSINOPHIL NFR BLD AUTO: 0.5 %
ERYTHROCYTE [DISTWIDTH] IN BLOOD BY AUTOMATED COUNT: 13.7 % (ref 11–15)
GLOBULIN PLAS-MCNC: 3.9 G/DL (ref 2.8–4.4)
GLUCOSE BLD-MCNC: 134 MG/DL (ref 70–99)
HCT VFR BLD AUTO: 45.2 % (ref 39–53)
HGB BLD-MCNC: 14.9 G/DL (ref 13–17.5)
IMM GRANULOCYTES # BLD AUTO: 0.2 X10(3) UL (ref 0–1)
IMM GRANULOCYTES NFR BLD: 3.6 %
LYMPHOCYTES # BLD AUTO: 1.12 X10(3) UL (ref 1–4)
LYMPHOCYTES NFR BLD AUTO: 20.3 %
M PROTEIN MFR SERPL ELPH: 7.6 G/DL (ref 6.4–8.2)
MCH RBC QN AUTO: 27.4 PG (ref 26–34)
MCHC RBC AUTO-ENTMCNC: 33 G/DL (ref 31–37)
MCV RBC AUTO: 83.2 FL (ref 80–100)
MONOCYTES # BLD AUTO: 0.45 X10(3) UL (ref 0.1–1)
MONOCYTES NFR BLD AUTO: 8.1 %
NEUTROPHILS # BLD AUTO: 3.7 X10 (3) UL (ref 1.5–7.7)
NEUTROPHILS # BLD AUTO: 3.7 X10(3) UL (ref 1.5–7.7)
NEUTROPHILS NFR BLD AUTO: 67 %
OSMOLALITY SERPL CALC.SUM OF ELEC: 288 MOSM/KG (ref 275–295)
PLATELET # BLD AUTO: 201 10(3)UL (ref 150–450)
POTASSIUM SERPL-SCNC: 3.9 MMOL/L (ref 3.5–5.1)
RBC # BLD AUTO: 5.43 X10(6)UL (ref 4.3–5.7)
SODIUM SERPL-SCNC: 139 MMOL/L (ref 136–145)
WBC # BLD AUTO: 5.5 X10(3) UL (ref 4–11)

## 2020-03-28 PROCEDURE — 80053 COMPREHEN METABOLIC PANEL: CPT | Performed by: PHYSICIAN ASSISTANT

## 2020-03-28 PROCEDURE — 96360 HYDRATION IV INFUSION INIT: CPT

## 2020-03-28 PROCEDURE — 99442 TELEPHONE E&M BY PHYSICIAN EST PT NOT ORIG PREV 7 DAYS 11-20 MIN: CPT | Performed by: NURSE PRACTITIONER

## 2020-03-28 PROCEDURE — 85025 COMPLETE CBC W/AUTO DIFF WBC: CPT | Performed by: PHYSICIAN ASSISTANT

## 2020-03-28 PROCEDURE — 99284 EMERGENCY DEPT VISIT MOD MDM: CPT

## 2020-03-28 PROCEDURE — 87999 UNLISTED MICROBIOLOGY PX: CPT

## 2020-03-28 PROCEDURE — 71045 X-RAY EXAM CHEST 1 VIEW: CPT | Performed by: PHYSICIAN ASSISTANT

## 2020-03-28 NOTE — ED PROVIDER NOTES
Patient Seen in: [facility] Emergency Department      History   Patient presents with:  Cough/URI  Dyspnea VIVIANA SOB    Stated Complaint: reports potential positive exposure ~ reports cough, reports occassional SOB (h*    HPI  Sami Lloyd is a 17-year-old m 122/107   Pulse (!) 126   Resp 18   Temp 98.4 °F (36.9 °C)   Temp src Oral   SpO2 97 %   O2 Device None (Room air)       Current:BP (!) 122/107   Pulse (!) 126   Temp 98.4 °F (36.9 °C) (Oral)   Resp 18   Ht 190.5 cm (6' 3\")   Wt (!) 176.9 kg   SpO2 97% Please view results for these tests on the individual orders.    RAINBOW DRAW BLUE   RAINBOW DRAW LAVENDER   RAINBOW DRAW LIGHT GREEN   RAINBOW DRAW GOLD   SARS-COV-2 BY PCR   CBC W/ DIFFERENTIAL          COVID-19 Risk Assessment      COVID-19 Ris control. He is warned of red flag symptoms that should warrant prompt reevaluation. The patient is encouraged to return if any concerning symptoms arise. Additional verbal discharge instructions are given and discussed.   Discharge medications are discuss

## 2020-03-28 NOTE — ED PROVIDER NOTES
27-year-old male presents to the emergency department and was seen by me as well as by the physician assistant for issues with a cough and low-grade fever.   He has a probable exposure to Covid patient does have some comorbidities including hypertension and

## 2020-03-28 NOTE — TELEPHONE ENCOUNTER
Patient called to discuss symptoms. He has been dealing with cough, shortness of breath x 2 days. Works at a [email or web address]. He has co-worker whose mother tested positive for COVID-19. Patient himself with no fevers/chills/sweats. No recent travels.   Dc Vera

## 2020-03-29 LAB — SARS-COV-2 RNA RESP QL NAA+PROBE: NOT DETECTED

## 2021-01-08 ENCOUNTER — HOSPITAL ENCOUNTER (OUTPATIENT)
Age: 36
Discharge: ANOTHER HOSPITAL | End: 2021-01-08
Payer: COMMERCIAL

## 2021-01-08 ENCOUNTER — APPOINTMENT (OUTPATIENT)
Dept: CT IMAGING | Facility: HOSPITAL | Age: 36
End: 2021-01-08
Attending: EMERGENCY MEDICINE
Payer: COMMERCIAL

## 2021-01-08 ENCOUNTER — HOSPITAL ENCOUNTER (EMERGENCY)
Facility: HOSPITAL | Age: 36
Discharge: HOME | End: 2021-01-08
Attending: EMERGENCY MEDICINE
Payer: COMMERCIAL

## 2021-01-08 VITALS
OXYGEN SATURATION: 95 % | SYSTOLIC BLOOD PRESSURE: 156 MMHG | HEART RATE: 72 BPM | TEMPERATURE: 99 F | HEIGHT: 75 IN | RESPIRATION RATE: 18 BRPM | DIASTOLIC BLOOD PRESSURE: 76 MMHG | BODY MASS INDEX: 39.17 KG/M2 | WEIGHT: 315 LBS

## 2021-01-08 VITALS
OXYGEN SATURATION: 98 % | SYSTOLIC BLOOD PRESSURE: 176 MMHG | TEMPERATURE: 98 F | RESPIRATION RATE: 18 BRPM | HEIGHT: 75 IN | BODY MASS INDEX: 49 KG/M2 | DIASTOLIC BLOOD PRESSURE: 87 MMHG | HEART RATE: 90 BPM

## 2021-01-08 DIAGNOSIS — N23 RENAL COLIC: Primary | ICD-10-CM

## 2021-01-08 DIAGNOSIS — R10.9 FLANK PAIN: Primary | ICD-10-CM

## 2021-01-08 LAB
ANION GAP SERPL CALC-SCNC: 5 MMOL/L (ref 0–18)
BILIRUB UR QL STRIP.AUTO: NEGATIVE
BUN BLD-MCNC: 9 MG/DL (ref 7–18)
BUN/CREAT SERPL: 8.3 (ref 10–20)
CALCIUM BLD-MCNC: 9.3 MG/DL (ref 8.5–10.1)
CHLORIDE SERPL-SCNC: 107 MMOL/L (ref 98–112)
CO2 SERPL-SCNC: 26 MMOL/L (ref 21–32)
COLOR UR AUTO: YELLOW
CREAT BLD-MCNC: 1.09 MG/DL
GLUCOSE BLD-MCNC: 112 MG/DL (ref 70–99)
GLUCOSE UR STRIP.AUTO-MCNC: NEGATIVE MG/DL
KETONES UR STRIP.AUTO-MCNC: NEGATIVE MG/DL
NITRITE UR QL STRIP.AUTO: NEGATIVE
OSMOLALITY SERPL CALC.SUM OF ELEC: 285 MOSM/KG (ref 275–295)
PH UR STRIP.AUTO: 5 [PH] (ref 4.5–8)
POTASSIUM SERPL-SCNC: 4.1 MMOL/L (ref 3.5–5.1)
PROT UR STRIP.AUTO-MCNC: NEGATIVE MG/DL
SODIUM SERPL-SCNC: 138 MMOL/L (ref 136–145)
SP GR UR STRIP.AUTO: 1.02 (ref 1–1.03)
UROBILINOGEN UR STRIP.AUTO-MCNC: 2 MG/DL

## 2021-01-08 PROCEDURE — 99284 EMERGENCY DEPT VISIT MOD MDM: CPT

## 2021-01-08 PROCEDURE — 81001 URINALYSIS AUTO W/SCOPE: CPT | Performed by: EMERGENCY MEDICINE

## 2021-01-08 PROCEDURE — 96360 HYDRATION IV INFUSION INIT: CPT

## 2021-01-08 PROCEDURE — 74176 CT ABD & PELVIS W/O CONTRAST: CPT | Performed by: EMERGENCY MEDICINE

## 2021-01-08 PROCEDURE — 99212 OFFICE O/P EST SF 10 MIN: CPT

## 2021-01-08 PROCEDURE — 96361 HYDRATE IV INFUSION ADD-ON: CPT

## 2021-01-08 PROCEDURE — 80048 BASIC METABOLIC PNL TOTAL CA: CPT | Performed by: EMERGENCY MEDICINE

## 2021-01-08 RX ORDER — TAMSULOSIN HYDROCHLORIDE 0.4 MG/1
0.4 CAPSULE ORAL DAILY
Qty: 7 CAPSULE | Refills: 0 | Status: SHIPPED | OUTPATIENT
Start: 2021-01-08 | End: 2021-01-08

## 2021-01-08 RX ORDER — ONDANSETRON 2 MG/ML
4 INJECTION INTRAMUSCULAR; INTRAVENOUS
Status: DISCONTINUED | OUTPATIENT
Start: 2021-01-08 | End: 2021-01-08

## 2021-01-08 RX ORDER — HYDROMORPHONE HYDROCHLORIDE 1 MG/ML
1 INJECTION, SOLUTION INTRAMUSCULAR; INTRAVENOUS; SUBCUTANEOUS EVERY 30 MIN PRN
Status: DISCONTINUED | OUTPATIENT
Start: 2021-01-08 | End: 2021-01-08

## 2021-01-08 RX ORDER — HYDROCODONE BITARTRATE AND ACETAMINOPHEN 5; 325 MG/1; MG/1
1-2 TABLET ORAL EVERY 6 HOURS PRN
Qty: 10 TABLET | Refills: 0 | Status: SHIPPED | OUTPATIENT
Start: 2021-01-08 | End: 2021-01-15

## 2021-01-08 RX ORDER — SODIUM CHLORIDE 9 MG/ML
INJECTION, SOLUTION INTRAVENOUS CONTINUOUS
Status: DISCONTINUED | OUTPATIENT
Start: 2021-01-08 | End: 2021-01-08

## 2021-01-08 RX ORDER — ONDANSETRON 8 MG/1
8 TABLET, ORALLY DISINTEGRATING ORAL EVERY 6 HOURS PRN
Qty: 10 TABLET | Refills: 0 | Status: SHIPPED | OUTPATIENT
Start: 2021-01-08 | End: 2021-07-10 | Stop reason: ALTCHOICE

## 2021-01-08 NOTE — ED INITIAL ASSESSMENT (HPI)
Pt presents today with c/o right-sided flank pain that began around 1000 this morning. Pt denies any n/v/d, urinary complaints or fevers. Pt has a hx of kidney stone x 3 years ago.

## 2021-01-08 NOTE — ED PROVIDER NOTES
Patient Seen in: [facility]      History   Patient presents with:  Flank Pain    Stated Complaint: LOW BACK PAIN RIGHT SIDE    HPI/Subjective:   HPI    Patient is a 79-year-old male present emergency room with right-sided flank pain.   Andreea Torrez PAIN RIGHT SIDE  Other systems are as noted in HPI. Constitutional and vital signs reviewed. All other systems reviewed and negative except as noted above.     Physical Exam     ED Triage Vitals [01/08/21 1146]   BP (!) 176/87   Pulse 90   Resp 18   T less than 2 seconds. Findings: No rash. Neurological:      General: No focal deficit present. Mental Status: He is alert and oriented to person, place, and time. Cranial Nerves: No cranial nerve deficit.                ED Course     Labs Re

## 2021-01-08 NOTE — ED NOTES
Pt reports hx of kidney stones in past, now having R sided back pain radiating to abdomen similar to past experience

## 2021-01-08 NOTE — ED PROVIDER NOTES
Patient Seen in: [facility] Emergency Department      History   Patient presents with:  Abdomen/Flank Pain    Stated Complaint: right flank pain    HPI/Subjective:   HPI    Patient was seen at urgent care complaining of right-sided flank pain.   [email or web address] Normal S1 and S2, without murmur or rub. Abdomen: Soft, morbidly obese but nondistended. Mildly tender in the right mid abdomen into the right lower quadrant. Right upper quadrant benign. Left abdomen benign.   No CVA tenderness  Extremities: Sonia Jean Baptiste a very small stone or non-radiopaque stone  Patient feels comfortable being discharged    I recommend:  Push fluids  Rest  Strain urine. Save the stone for your doctor. Motrin for pain  Norco for severe pain.   You may alternate between Motrin and Norco as

## 2021-04-09 DIAGNOSIS — Z23 NEED FOR VACCINATION: ICD-10-CM

## 2021-07-10 ENCOUNTER — HOSPITAL ENCOUNTER (OUTPATIENT)
Age: 36
Discharge: HOME | End: 2021-07-10
Payer: COMMERCIAL

## 2021-07-10 VITALS
DIASTOLIC BLOOD PRESSURE: 99 MMHG | RESPIRATION RATE: 18 BRPM | WEIGHT: 315 LBS | HEART RATE: 102 BPM | HEIGHT: 75 IN | BODY MASS INDEX: 39.17 KG/M2 | TEMPERATURE: 98 F | SYSTOLIC BLOOD PRESSURE: 159 MMHG | OXYGEN SATURATION: 95 %

## 2021-07-10 DIAGNOSIS — J01.90 ACUTE NON-RECURRENT SINUSITIS, UNSPECIFIED LOCATION: Primary | ICD-10-CM

## 2021-07-10 DIAGNOSIS — J06.9 VIRAL URI WITH COUGH: ICD-10-CM

## 2021-07-10 PROCEDURE — 99213 OFFICE O/P EST LOW 20 MIN: CPT

## 2021-07-10 RX ORDER — FLUTICASONE PROPIONATE 50 MCG
2 SPRAY, SUSPENSION (ML) NASAL DAILY
Qty: 16 G | Refills: 0 | Status: SHIPPED | OUTPATIENT
Start: 2021-07-10 | End: 2021-08-09

## 2021-07-10 RX ORDER — AMOXICILLIN AND CLAVULANATE POTASSIUM 875; 125 MG/1; MG/1
1 TABLET, FILM COATED ORAL 2 TIMES DAILY
Qty: 20 TABLET | Refills: 0 | Status: SHIPPED | OUTPATIENT
Start: 2021-07-10 | End: 2021-07-20

## 2021-07-10 NOTE — ED INITIAL ASSESSMENT (HPI)
Pt presents today with c/o sinus pressure/congestion and non-productive cough since Wednesday. Pt denies any fevers.

## 2022-05-16 ENCOUNTER — OFFICE VISIT (OUTPATIENT)
Dept: INTERNAL MEDICINE CLINIC | Facility: CLINIC | Age: 37
End: 2022-05-16
Payer: COMMERCIAL

## 2022-05-16 VITALS
OXYGEN SATURATION: 98 % | WEIGHT: 315 LBS | HEART RATE: 89 BPM | RESPIRATION RATE: 15 BRPM | SYSTOLIC BLOOD PRESSURE: 138 MMHG | HEIGHT: 74.61 IN | DIASTOLIC BLOOD PRESSURE: 70 MMHG | BODY MASS INDEX: 39.58 KG/M2 | TEMPERATURE: 98 F

## 2022-05-16 DIAGNOSIS — Z00.00 ENCOUNTER FOR PREVENTATIVE ADULT HEALTH CARE EXAMINATION: Primary | ICD-10-CM

## 2022-05-16 DIAGNOSIS — N52.9 ERECTILE DYSFUNCTION, UNSPECIFIED ERECTILE DYSFUNCTION TYPE: ICD-10-CM

## 2022-05-16 DIAGNOSIS — E66.01 MORBID OBESITY WITH BMI OF 50.0-59.9, ADULT (HCC): ICD-10-CM

## 2022-05-16 DIAGNOSIS — E55.9 VITAMIN D DEFICIENCY: ICD-10-CM

## 2022-05-16 PROBLEM — R94.31 ABNORMAL EKG: Status: RESOLVED | Noted: 2018-12-01 | Resolved: 2022-05-16

## 2022-05-16 PROBLEM — R45.86 MOOD SWINGS: Status: RESOLVED | Noted: 2018-12-01 | Resolved: 2022-05-16

## 2022-05-16 PROBLEM — R53.83 LACK OF ENERGY: Status: RESOLVED | Noted: 2018-12-01 | Resolved: 2022-05-16

## 2022-05-16 PROCEDURE — 3008F BODY MASS INDEX DOCD: CPT | Performed by: INTERNAL MEDICINE

## 2022-05-16 PROCEDURE — 3078F DIAST BP <80 MM HG: CPT | Performed by: INTERNAL MEDICINE

## 2022-05-16 PROCEDURE — 99395 PREV VISIT EST AGE 18-39: CPT | Performed by: INTERNAL MEDICINE

## 2022-05-16 PROCEDURE — 3075F SYST BP GE 130 - 139MM HG: CPT | Performed by: INTERNAL MEDICINE

## 2022-05-16 RX ORDER — SILDENAFIL 25 MG/1
TABLET, FILM COATED ORAL
Qty: 90 TABLET | Refills: 1 | Status: SHIPPED | OUTPATIENT
Start: 2022-05-16

## 2022-05-16 NOTE — PATIENT INSTRUCTIONS
- Get blood work done at [facility] PM or later  - Sildenafil (Viagra) sent to [facility]  - After the lab tests we will likely have you follow up with Susy Reaves. It was a pleasure seeing you in the clinic today. Thank you for choosing the [facility] office for your healthcare needs. Please call at [phone] with any questions or concerns.     Juno Quinones MD

## 2022-05-17 DIAGNOSIS — E55.9 VITAMIN D DEFICIENCY: Primary | ICD-10-CM

## 2022-05-17 PROBLEM — R73.09 ELEVATED HEMOGLOBIN A1C: Status: ACTIVE | Noted: 2022-05-17

## 2022-05-17 PROBLEM — R74.8 ELEVATED LIVER ENZYMES: Status: ACTIVE | Noted: 2022-05-17

## 2022-05-17 LAB
ABSOLUTE BASOPHILS: 124 CELLS/UL (ref 0–200)
ABSOLUTE EOSINOPHILS: 599 CELLS/UL (ref 15–500)
ABSOLUTE LYMPHOCYTES: 1932 CELLS/UL (ref 850–3900)
ABSOLUTE MONOCYTES: 757 CELLS/UL (ref 200–950)
ABSOLUTE NEUTROPHILS: 7887 CELLS/UL (ref 1500–7800)
ALBUMIN/GLOBULIN RATIO: 1.6 (CALC) (ref 1–2.5)
ALBUMIN: 4.4 G/DL (ref 3.6–5.1)
ALKALINE PHOSPHATASE: 79 U/L (ref 36–130)
ALT: 60 U/L (ref 9–46)
AST: 51 U/L (ref 10–40)
BASOPHILS: 1.1 %
BILIRUBIN, TOTAL: 0.7 MG/DL (ref 0.2–1.2)
BUN: 10 MG/DL (ref 7–25)
CALCIUM: 9.5 MG/DL (ref 8.6–10.3)
CARBON DIOXIDE: 30 MMOL/L (ref 20–32)
CHLORIDE: 101 MMOL/L (ref 98–110)
CHOL/HDLC RATIO: 3.8 (CALC)
CHOLESTEROL, TOTAL: 200 MG/DL
CREATININE: 0.79 MG/DL (ref 0.6–1.35)
EGFR IF AFRICN AM: 135 ML/MIN/1.73M2
EGFR IF NONAFRICN AM: 116 ML/MIN/1.73M2
EOSINOPHILS: 5.3 %
GLOBULIN: 2.7 G/DL (CALC) (ref 1.9–3.7)
GLUCOSE: 85 MG/DL (ref 65–99)
HDL CHOLESTEROL: 52 MG/DL
HEMATOCRIT: 43.9 % (ref 38.5–50)
HEMOGLOBIN A1C: 5.8 % OF TOTAL HGB
HEMOGLOBIN: 14.4 G/DL (ref 13.2–17.1)
LDL-CHOLESTEROL: 126 MG/DL (CALC)
LYMPHOCYTES: 17.1 %
MCH: 27.1 PG (ref 27–33)
MCHC: 32.8 G/DL (ref 32–36)
MCV: 82.7 FL (ref 80–100)
MONOCYTES: 6.7 %
MPV: 11.1 FL (ref 7.5–12.5)
NEUTROPHILS: 69.8 %
NON-HDL CHOLESTEROL: 148 MG/DL (CALC)
PLATELET COUNT: 289 THOUSAND/UL (ref 140–400)
POTASSIUM: 4.3 MMOL/L (ref 3.5–5.3)
PROTEIN, TOTAL: 7.1 G/DL (ref 6.1–8.1)
RDW: 14 % (ref 11–15)
RED BLOOD CELL COUNT: 5.31 MILLION/UL (ref 4.2–5.8)
SODIUM: 138 MMOL/L (ref 135–146)
TRIGLYCERIDES: 111 MG/DL
TSH W/REFLEX TO FT4: 2.03 MIU/L (ref 0.4–4.5)
VITAMIN D, 25-OH, TOTAL: 17 NG/ML (ref 30–100)
WHITE BLOOD CELL COUNT: 11.3 THOUSAND/UL (ref 3.8–10.8)

## 2022-05-17 RX ORDER — ERGOCALCIFEROL 1.25 MG/1
50000 CAPSULE ORAL WEEKLY
Qty: 12 CAPSULE | Refills: 0 | Status: SHIPPED | OUTPATIENT
Start: 2022-05-17

## 2023-07-13 ENCOUNTER — OFFICE VISIT (OUTPATIENT)
Dept: INTERNAL MEDICINE CLINIC | Facility: CLINIC | Age: 38
End: 2023-07-13
Payer: COMMERCIAL

## 2023-07-13 ENCOUNTER — MED REC SCAN ONLY (OUTPATIENT)
Dept: FAMILY MEDICINE CLINIC | Facility: CLINIC | Age: 38
End: 2023-07-13

## 2023-07-13 VITALS
BODY MASS INDEX: 40.43 KG/M2 | HEART RATE: 90 BPM | RESPIRATION RATE: 16 BRPM | SYSTOLIC BLOOD PRESSURE: 138 MMHG | WEIGHT: 315 LBS | HEIGHT: 74 IN | OXYGEN SATURATION: 98 % | DIASTOLIC BLOOD PRESSURE: 88 MMHG

## 2023-07-13 DIAGNOSIS — E55.9 VITAMIN D DEFICIENCY: ICD-10-CM

## 2023-07-13 DIAGNOSIS — R63.5 WEIGHT GAIN: ICD-10-CM

## 2023-07-13 DIAGNOSIS — Z51.81 THERAPEUTIC DRUG MONITORING: Primary | ICD-10-CM

## 2023-07-13 DIAGNOSIS — E66.01 MORBID OBESITY WITH BMI OF 50.0-59.9, ADULT (HCC): ICD-10-CM

## 2023-07-13 DIAGNOSIS — R73.03 PREDIABETES: ICD-10-CM

## 2023-07-13 DIAGNOSIS — R74.8 ELEVATED LIVER ENZYMES: ICD-10-CM

## 2023-07-13 PROCEDURE — 99203 OFFICE O/P NEW LOW 30 MIN: CPT | Performed by: NURSE PRACTITIONER

## 2023-07-13 PROCEDURE — 3079F DIAST BP 80-89 MM HG: CPT | Performed by: NURSE PRACTITIONER

## 2023-07-13 PROCEDURE — 3008F BODY MASS INDEX DOCD: CPT | Performed by: NURSE PRACTITIONER

## 2023-07-13 PROCEDURE — 3075F SYST BP GE 130 - 139MM HG: CPT | Performed by: NURSE PRACTITIONER

## 2023-07-13 RX ORDER — PHENTERMINE HYDROCHLORIDE 15 MG/1
15 CAPSULE ORAL EVERY MORNING
Qty: 30 CAPSULE | Refills: 1 | Status: SHIPPED | OUTPATIENT
Start: 2023-07-13

## 2023-07-13 NOTE — PATIENT INSTRUCTIONS
Next steps:  1. Fill your prescribed medication and take as discussed and prescribed: phentermine 15mg  2. Schedule a personal nutrition consultation with one of our registered dieticians  3. Check with insurance on coverage for GLP-1 medications:  (ie. saxenda- daily, wegovy- weekly) or for the diagnosis of prediabetes or diabetes- (ie. Ozempic- weekly, trulicity- weekly, rybelsus- oral/ daily)    4. Get blood work done     Please try to work on the following dietary changes:  Daily protein recommendation to start: 140-200 grams  Daily carbohydrate: <200g  Daily calories: 3,200- 3,300  1. Drink water with meals and throughout the day, cut down on soda and/or juice if consumed. Consider flavored water options like Bubbly, Spindrift, Hint and Marilou. 2.  Eat breakfast daily and focus on having protein with each meal, examples include: greek yogurt, cottage cheese, hard boiled egg, whole grain toast with peanut butter. 3.  Reduce refined carbohydrates and sugars which includes items such as sweets, as well as rice, pasta, and bread and make sure to choose whole grain options when having them with just 1 serving per meal about the size of your inner palm. 4.  Consume non starchy veggies daily working towards making them a good 50% of your daily food intake. Add them to lunch and dinner consistently. 5.  Start a daily probiotic: VSL#3 is recommended, (order on line at www.vsl3. com). Take 1 capsule daily with water for 30 days, then reduce to 1 every other day (this will reduce the cost). Capsules can be left out for 2 weeks, but then must be refrigerated. Please download adán My Fitness Etta Herring! Or Net Diary to monitor daily dietary intake and you will be able to see if you are eating the right amount of calories or too much or too little which would hinder weight loss. Additionally this will help to see your daily carbohydrate and protein intake.  When you set the adán up choose 1-2 lbs/week as a goal. Keeping a paper food journal is an option as well to remain accountable for your choices- this is the start to mindful eating! A low calorie diet has been consistently shown to support weight loss. Continue or start exercising to help establish a routine. If not already exercising begin with 1 day and progress as able with long-term goal of 30 minutes 5 days a week at a minimum. Meditation daily can help manage and control stress. Chronic stress can make weight loss difficult. Exercising is one way to help with stress, but meditation using the CALM Loly or another comparable alternative can be done in your home or place of work with little time commitment. This Loly can also help work on behavior change and improve sleep. Check out the segment under Calm Masterclass and listen to The 4 Pillars of Health. A great way to begin learning about the foundation of lifestyle with practical tips to use in your every day. Check out www.yourweightmatters. org blog for continued daily support and education along this weight loss journey! Patient Resources:     Personal Training/Fitness Classes/Health Coaching     Patsy Hernandez and Reddy Sophiaside @ [email or web address] Full fitness center with group fitness and personal training. Discount available as client of [facility] Weight Management. Health Coaching and Personal Training with Dereck Barton at our [facility]- individual weekly coaching with option to add personal training and small group fitness classes targeted at weight loss- [phone] and/or email @ [email or web address]  360FIT [place] [email or web address] Group Fitness [phone] and/or email Marlene Myers at [email or web address]  [address] with multiple locations: Aetna (www.Scannx. Alc Holdings), Eat The Frog Fitness (www.ARTtwo50. Alc Holdings), Fit Body Bootcamp (www.ChipXbodybootcamp.Alc Holdings), Delta Life Fitness (www.EdeniQ. Guided Surgery Solutions), The Exercise  (www.exercisecoach.Guided Surgery Solutions)     Online Fitness  Fitness  on Whole Foods in 10 DVD series- www. tnddo59AGY. Guided Surgery Solutions  Sit and Be Fit - Chair exercise series Www.sitandbefit. org  Hip Hop Fit with Farhan Ya at www.hiphopfit. net     Apps for on the FutureGen Capital 7 Minute Workout (orange box with white 7) - free on the go HIIT training loly  Peloton Loly @ Building Our Community     Nutrition Trackers and Tools  LoseIT! And My Fitness Pal apps and on line for tracking nutrition  NOOM - virtual health coaching  FitFoundation (healthy meals on the go) in [employer]-SCI @ JoggleBug [email or web address]. Heri Gonzalez MD @ wwwLegendary Entertainment and Benita Marcano (keto and low carb plans recommended) @ wwwFirepro Systems.LRA, Metabolic Meals @ www. MyMetabolicMeals. com - individual prepared meals to go  Assemblage, LemonQuest, International Business Machines, Every Plate, RCT Logic- on line meal delivery programs for preparation at home  [place] Accelalox in [place] for homemade meals to go @ wwwFloobitsmealBlueshift International Materials. Guided Surgery Solutions  Diet Doctor @ www. dietdoctor. com - low carb swaps  YummGrid Mobile - meal prep and planning loly (www.yummly. com)     Stress Management/Behavior/Mindful Eating  CALM meditation loly (www.calm. Guided Surgery Solutions)  Headspace  Am I Hungry? Mindful eating virtual  loly  Www.yourweightmatters. org - Obesity Action Coalition sponsored Blog posts daily  Motivation loly (black box with white \")- daily supportive messages sent to your phone     Books/Video Education/Podcasts  Mindless Eating by Johanna Boateng  Why We Get Sick by Dimitri Lopez (a book about insulin resistance)  Atomic Habits by Delisa Jung (a book about taking small steps to promote greater behavior change)   Can't Hurt Me by Armand Hays (a book exploring the power of discipline in achieving your goals)  The End of Dieting: How to Live for Life by Dr. Kimber Alanis M.D. or listen to The [facility] Episode 61:  Understanding \"Nutritarian\" Eating w/Dr. Quiroga Mews  Your Body in Balance: The World Fuel Services Corporation of Food, Hormones, and Health by Dr. Des Johansen  The Menopause Diet Plan by [facility] HOSP AT [facility]  The Complete Guide to fasting by Dr. Nicolas Casillas, [address] [facility] by Santiago Hampton, Ph.D, R.D. Weight Loss Surgery Will Not Treat Food Addiction by Sushila Valenzuela Ph.D  The [address] [place] [address] on plant based nutrition  Fed Up - documentary about obesity (Free on [place])  The Truth About Sugar - documentary on sugar (Free on "Hera Systems[employer] Inc.", https://youAvanti Miningu. be/2A7qbgoYN5l)  The Dr. Esperanza Barrios by Dr. Gerard Turner MD  Fitlosophy Fitspiration - journal to better health (found at [employer] in fitness aisle)  What Happened to You?- a look at the impact trauma has on behavior written by Daniela Blood and Dr. Bre Gardner Again by Deya Casper - discovering your true self after trauma  Diino Systems talk on Accipiter Radar, The Call to Courage  Podcasts: The Exam Room by the Physician's Committee, Nutrition Facts by Dr. Freddie Lombardi    We are here to support you with weight loss, but please remember that you still need your primary care provider for your routine health maintenance.

## 2023-07-16 LAB
ABSOLUTE BASOPHILS: 93 CELLS/UL (ref 0–200)
ABSOLUTE EOSINOPHILS: 419 CELLS/UL (ref 15–500)
ABSOLUTE LYMPHOCYTES: 1293 CELLS/UL (ref 850–3900)
ABSOLUTE MONOCYTES: 744 CELLS/UL (ref 200–950)
ABSOLUTE NEUTROPHILS: 6752 CELLS/UL (ref 1500–7800)
ALBUMIN/GLOBULIN RATIO: 1.8 (CALC) (ref 1–2.5)
ALBUMIN: 4.2 G/DL (ref 3.6–5.1)
ALKALINE PHOSPHATASE: 75 U/L (ref 36–130)
ALT: 80 U/L (ref 9–46)
AST: 64 U/L (ref 10–40)
BASOPHILS: 1 %
BILIRUBIN, TOTAL: 0.9 MG/DL (ref 0.2–1.2)
BUN: 8 MG/DL (ref 7–25)
CALCIUM: 9.2 MG/DL (ref 8.6–10.3)
CARBON DIOXIDE: 28 MMOL/L (ref 20–32)
CHLORIDE: 103 MMOL/L (ref 98–110)
CHOL/HDLC RATIO: 4 (CALC)
CHOLESTEROL, TOTAL: 194 MG/DL
CREATININE: 0.8 MG/DL (ref 0.6–1.26)
EGFR: 118 ML/MIN/1.73M2
EOSINOPHILS: 4.5 %
GLOBULIN: 2.4 G/DL (CALC) (ref 1.9–3.7)
GLUCOSE: 105 MG/DL (ref 65–99)
HDL CHOLESTEROL: 49 MG/DL
HEMATOCRIT: 43.4 % (ref 38.5–50)
HEMOGLOBIN A1C: 6.2 % OF TOTAL HGB
HEMOGLOBIN: 13.7 G/DL (ref 13.2–17.1)
LDL-CHOLESTEROL: 125 MG/DL (CALC)
LYMPHOCYTES: 13.9 %
MCH: 26.2 PG (ref 27–33)
MCHC: 31.6 G/DL (ref 32–36)
MCV: 83 FL (ref 80–100)
MONOCYTES: 8 %
MPV: 11.5 FL (ref 7.5–12.5)
NEUTROPHILS: 72.6 %
NON-HDL CHOLESTEROL: 145 MG/DL (CALC)
PLATELET COUNT: 273 THOUSAND/UL (ref 140–400)
POTASSIUM: 4.2 MMOL/L (ref 3.5–5.3)
PROTEIN, TOTAL: 6.6 G/DL (ref 6.1–8.1)
RDW: 13.8 % (ref 11–15)
RED BLOOD CELL COUNT: 5.23 MILLION/UL (ref 4.2–5.8)
SODIUM: 140 MMOL/L (ref 135–146)
T4, FREE: 1.2 NG/DL (ref 0.8–1.8)
TRIGLYCERIDES: 97 MG/DL
TSH: 1.14 MIU/L (ref 0.4–4.5)
VITAMIN B12: 479 PG/ML (ref 200–1100)
VITAMIN D, 25-OH, TOTAL: 21 NG/ML (ref 30–100)
WHITE BLOOD CELL COUNT: 9.3 THOUSAND/UL (ref 3.8–10.8)

## 2023-07-19 ENCOUNTER — PATIENT MESSAGE (OUTPATIENT)
Dept: INTERNAL MEDICINE CLINIC | Facility: CLINIC | Age: 38
End: 2023-07-19

## 2023-07-19 DIAGNOSIS — R73.03 PREDIABETES: Primary | ICD-10-CM

## 2023-07-19 DIAGNOSIS — E66.01 MORBID OBESITY WITH BMI OF 50.0-59.9, ADULT (HCC): ICD-10-CM

## 2023-07-19 NOTE — TELEPHONE ENCOUNTER
From: Carmen Jackson  To: Estelle SinghJEREMY  Sent: 7/19/2023 8:12 AM CDT  Subject: Response to test results note    In regards to the fatty liver situation, I honestly don't know if I have been \"diagnosed\" with it or not. It has definitely been brought to my attention before, but I think it was kind of a \"the numbers generally lean towards having fatty liver, let's keep an eye on it\" kind of thing. I had an ultrasound in 2019 when I had my gallbladder removed, they may have mentioned it to me then as well, not 100% sure.

## 2023-07-23 RX ORDER — PEN NEEDLE, DIABETIC 30 GX3/16"
1 NEEDLE, DISPOSABLE MISCELLANEOUS DAILY
Qty: 100 EACH | Refills: 1 | Status: SHIPPED | OUTPATIENT
Start: 2023-07-23

## 2023-07-23 RX ORDER — SEMAGLUTIDE 0.68 MG/ML
0.25 INJECTION, SOLUTION SUBCUTANEOUS WEEKLY
Qty: 3 ML | Refills: 1 | Status: SHIPPED | OUTPATIENT
Start: 2023-07-23

## 2023-08-11 ENCOUNTER — APPOINTMENT (OUTPATIENT)
Dept: CT IMAGING | Age: 38
End: 2023-08-11
Attending: EMERGENCY MEDICINE
Payer: COMMERCIAL

## 2023-08-11 ENCOUNTER — HOSPITAL ENCOUNTER (OUTPATIENT)
Age: 38
Discharge: HOME | End: 2023-08-11
Attending: EMERGENCY MEDICINE
Payer: COMMERCIAL

## 2023-08-11 VITALS
TEMPERATURE: 97 F | HEART RATE: 81 BPM | WEIGHT: 315 LBS | BODY MASS INDEX: 40 KG/M2 | OXYGEN SATURATION: 97 % | DIASTOLIC BLOOD PRESSURE: 83 MMHG | RESPIRATION RATE: 18 BRPM | SYSTOLIC BLOOD PRESSURE: 148 MMHG | HEIGHT: 74.5 IN

## 2023-08-11 DIAGNOSIS — N20.0 KIDNEY STONE: Primary | ICD-10-CM

## 2023-08-11 LAB
#MXD IC: 0.7 X10ˆ3/UL (ref 0.1–1)
BASOPHILS # BLD AUTO: 0.1 X10(3) UL (ref 0–0.2)
BASOPHILS NFR BLD AUTO: 0.8 %
BILIRUB UR QL STRIP: NEGATIVE
BUN BLD-MCNC: 11 MG/DL (ref 7–18)
CHLORIDE BLD-SCNC: 105 MMOL/L (ref 98–112)
CLARITY UR: CLEAR
CO2 BLD-SCNC: 20 MMOL/L (ref 21–32)
CREAT BLD-MCNC: 0.7 MG/DL
EGFRCR SERPLBLD CKD-EPI 2021: 122 ML/MIN/1.73M2 (ref 60–?)
EOSINOPHIL # BLD AUTO: 0.07 X10(3) UL (ref 0–0.7)
EOSINOPHIL NFR BLD AUTO: 0.6 %
ERYTHROCYTE [DISTWIDTH] IN BLOOD BY AUTOMATED COUNT: 14 %
GLUCOSE BLD-MCNC: 136 MG/DL (ref 70–99)
GLUCOSE UR STRIP-MCNC: NEGATIVE MG/DL
HCT VFR BLD AUTO: 43.5 %
HCT VFR BLD AUTO: 43.8 %
HCT VFR BLD CALC: 43 %
HGB BLD-MCNC: 14 G/DL
HGB BLD-MCNC: 14.7 G/DL
IMM GRANULOCYTES # BLD AUTO: 0.25 X10(3) UL (ref 0–1)
IMM GRANULOCYTES NFR BLD: 2.1 %
ISTAT IONIZED CALCIUM FOR CHEM 8: 1.01 MMOL/L (ref 1.12–1.32)
LEUKOCYTE ESTERASE UR QL STRIP: NEGATIVE
LYMPHOCYTES # BLD AUTO: 0.88 X10(3) UL (ref 1–4)
LYMPHOCYTES # BLD AUTO: 1 X10ˆ3/UL (ref 1–4)
LYMPHOCYTES NFR BLD AUTO: 7.4 %
LYMPHOCYTES NFR BLD AUTO: 8.5 %
MCH RBC QN AUTO: 26.2 PG (ref 26–34)
MCH RBC QN AUTO: 26.8 PG (ref 26–34)
MCHC RBC AUTO-ENTMCNC: 32 G/DL (ref 31–37)
MCHC RBC AUTO-ENTMCNC: 33.8 G/DL (ref 31–37)
MCV RBC AUTO: 79.4 FL
MCV RBC AUTO: 82 FL (ref 80–100)
MIXED CELL %: 5.8 %
MONOCYTES # BLD AUTO: 0.83 X10(3) UL (ref 0.1–1)
MONOCYTES NFR BLD AUTO: 7 %
NEUTROPHILS # BLD AUTO: 10 X10ˆ3/UL (ref 1.5–7.7)
NEUTROPHILS # BLD AUTO: 9.76 X10 (3) UL (ref 1.5–7.7)
NEUTROPHILS # BLD AUTO: 9.76 X10(3) UL (ref 1.5–7.7)
NEUTROPHILS NFR BLD AUTO: 82.1 %
NEUTROPHILS NFR BLD AUTO: 85.7 %
NITRITE UR QL STRIP: NEGATIVE
PH UR STRIP: 5.5 [PH]
PLATELET # BLD AUTO: 230 10(3)UL (ref 150–450)
POTASSIUM BLD-SCNC: 4.4 MMOL/L (ref 3.6–5.1)
RBC # BLD AUTO: 5.34 X10ˆ6/UL
RBC # BLD AUTO: 5.48 X10(6)UL
SODIUM BLD-SCNC: 136 MMOL/L (ref 136–145)
SP GR UR STRIP: 1.02
UROBILINOGEN UR STRIP-ACNC: <2 MG/DL
WBC # BLD AUTO: 11.7 X10ˆ3/UL (ref 4–11)
WBC # BLD AUTO: 11.9 X10(3) UL (ref 4–11)

## 2023-08-11 PROCEDURE — 99215 OFFICE O/P EST HI 40 MIN: CPT

## 2023-08-11 PROCEDURE — 81002 URINALYSIS NONAUTO W/O SCOPE: CPT

## 2023-08-11 PROCEDURE — 85025 COMPLETE CBC W/AUTO DIFF WBC: CPT | Performed by: EMERGENCY MEDICINE

## 2023-08-11 PROCEDURE — 99214 OFFICE O/P EST MOD 30 MIN: CPT

## 2023-08-11 PROCEDURE — 74177 CT ABD & PELVIS W/CONTRAST: CPT | Performed by: EMERGENCY MEDICINE

## 2023-08-11 PROCEDURE — 96360 HYDRATION IV INFUSION INIT: CPT

## 2023-08-11 PROCEDURE — 80047 BASIC METABLC PNL IONIZED CA: CPT

## 2023-08-11 RX ORDER — SODIUM CHLORIDE 9 MG/ML
1000 INJECTION, SOLUTION INTRAVENOUS ONCE
Status: COMPLETED | OUTPATIENT
Start: 2023-08-11 | End: 2023-08-11

## 2023-08-11 RX ORDER — HYDROCODONE BITARTRATE AND ACETAMINOPHEN 5; 325 MG/1; MG/1
1-2 TABLET ORAL EVERY 6 HOURS PRN
Qty: 10 TABLET | Refills: 0 | Status: SHIPPED | OUTPATIENT
Start: 2023-08-11 | End: 2023-08-16

## 2023-08-11 NOTE — DISCHARGE INSTRUCTIONS
Strain your urine. Follow-up with your PCP or urology this week. Return to the emergency room if you have pain not controlled with pain medications or you develop fever or vomiting.

## 2023-08-11 NOTE — ED INITIAL ASSESSMENT (HPI)
C/o abd/flank pain since this morning. Pt reports abd and flank pain at the same time. Pt denies urinary symptoms. Pt reports n/v/constipation. Pt reports pain in groin area. Pt reports hx of kidney stones. Pt reports taking Motrin 800mg at 0630 today.

## 2023-09-08 ENCOUNTER — TELEMEDICINE (OUTPATIENT)
Dept: INTERNAL MEDICINE CLINIC | Facility: CLINIC | Age: 38
End: 2023-09-08
Payer: COMMERCIAL

## 2023-09-08 DIAGNOSIS — E55.9 VITAMIN D DEFICIENCY: ICD-10-CM

## 2023-09-08 DIAGNOSIS — R74.8 ELEVATED LIVER ENZYMES: ICD-10-CM

## 2023-09-08 DIAGNOSIS — E66.01 MORBID OBESITY WITH BMI OF 50.0-59.9, ADULT (HCC): ICD-10-CM

## 2023-09-08 DIAGNOSIS — R06.83 SNORING: ICD-10-CM

## 2023-09-08 DIAGNOSIS — Z51.81 THERAPEUTIC DRUG MONITORING: Primary | ICD-10-CM

## 2023-09-08 DIAGNOSIS — R73.03 PREDIABETES: ICD-10-CM

## 2023-09-08 PROCEDURE — 99213 OFFICE O/P EST LOW 20 MIN: CPT | Performed by: NURSE PRACTITIONER

## 2023-09-08 RX ORDER — SEMAGLUTIDE 1.34 MG/ML
1 INJECTION, SOLUTION SUBCUTANEOUS WEEKLY
Qty: 3 ML | Refills: 2 | Status: SHIPPED | OUTPATIENT
Start: 2023-09-26

## 2023-09-08 RX ORDER — PHENTERMINE HYDROCHLORIDE 15 MG/1
15 CAPSULE ORAL EVERY MORNING
Qty: 30 CAPSULE | Refills: 2 | Status: SHIPPED | OUTPATIENT
Start: 2023-09-15

## 2023-09-08 NOTE — PROGRESS NOTES
[facility] WEIGHT MANAGEMENT VIRTUAL ENCOUNTER     Renny Warner verbally consents to a Virtual/Telephone Check-In service on 09/08/23   Patient understands and accepts financial responsibility for any deductible, co-insurance and/or co-pays associated with this service. HISTORY OF PRESENT ILLNESS  Patient presents with: Other: F/u on weight management     Renny Warner is a 41year old male is being evaluated as a video visit using Telemedicine with live, interactive video and audio    Weight gain/loss since LOV based on home monitoring:   Home scale: #424 lbs  Has lost #40 lbs since LOV 2 months ago     Compliance with medication: phentermine 15mg, ozempic 0.25mg weekly x 4 weeks  Tolerating well, helping with decreasing appetite and no side effects     Is feeling good- getting in more protein   Really has reduced calories  Was in the ER for kidney stone about 1 month ago- saw urology and they are watching it to see if it will pass   Exercise/Activity: walking 1-2 days per week, indoor bike (2-3 times per week)   Nutrition: eating regular meals, +protein, minimal veggies. not tracking reports  Stress is manageable   Sleep: 4-5 hours/night, waking up feeling tired     Denies chest pain, shortness of breath, dizziness, blurred vision, headache, paresthesia, nausea/vomiting.      Wt Readings from Last 6 Encounters:  08/11/23 : (!) 445 lb (201.9 kg)  07/13/23 : (!) 464 lb (210.5 kg)  05/16/22 : (!) 429 lb 9.6 oz (194.9 kg)  07/10/21 : (!) 415 lb (188.2 kg)  01/08/21 : (!) 400 lb (181.4 kg)  03/28/20 : (!) 390 lb (176.9 kg)         Subjective  REVIEW OF SYSTEMS  GENERAL HEALTH: feels well otherwise, denied any fevers chills or night sweats   RESPIRATORY: denies shortness of breath   CARDIOVASCULAR: denies chest pain  GI: denies abdominal pain  PSYCH: denies any mood changes    Objective  EXAM  Reviewed most recent set of vitals   Physical Exam:  GENERAL: well developed, well nourished, in no apparent distress, speaking in full sentences comfortably   SKIN: warm, pink, dry without rashes to exposed area   EYES: conjunctiva pink  HEENT: atraumatic, normocephalic  LUNGS: normal work of breathing, non labored  CARDIO: normal work, no exertion  EXTREMITIES: no cyanosis, no clubbing, no edema  NEURO: Oriented times three  PSYCH: pleasant, cooperative, normal mood and affect    Lab Results   Component Value Date    WBC 11.9 (H) 08/11/2023    RBC 5.48 08/11/2023    HGB 14.7 08/11/2023    HCT 43.5 08/11/2023    MCV 82.0 08/11/2023    MCV 79.4 (L) 08/11/2023    MCH 26.8 08/11/2023    MCHC 32.0 08/11/2023    MCHC 33.8 08/11/2023    RDW 14.0 08/11/2023    .0 08/11/2023     Lab Results   Component Value Date     (H) 07/15/2023    BUN 8 07/15/2023    BUNCREA NOT APPLICABLE 68/49/8814    CREATSERUM 0.80 07/15/2023    ANIONGAP 5 01/08/2021    GFRNAA 116 05/16/2022    GFRAA 135 05/16/2022    CA 9.2 07/15/2023    OSMOCALC 285 01/08/2021    ALKPHO 75 07/15/2023    AST 64 (H) 07/15/2023    ALT 80 (H) 07/15/2023    BILT 0.9 07/15/2023    TP 6.6 07/15/2023    ALB 4.2 07/15/2023    GLOBULIN 2.4 07/15/2023    AGRATIO 1.8 07/15/2023     07/15/2023    K 4.2 07/15/2023     07/15/2023    CO2 28 07/15/2023     Lab Results   Component Value Date     10/27/2018    A1C 6.2 (H) 07/15/2023     Lab Results   Component Value Date    CHOLEST 194 07/15/2023    TRIG 97 07/15/2023    HDL 49 07/15/2023     (H) 07/15/2023    VLDL 19 10/27/2018    TCHDLRATIO 4.0 07/15/2023    [place] 145 (H) 07/15/2023     Lab Results   Component Value Date    T4F 1.2 07/15/2023    TSH 1.14 07/15/2023    TSHT4 2.03 05/16/2022     Lab Results   Component Value Date    B12 521 12/04/2018    VITB12 479 07/15/2023     Lab Results   Component Value Date    VITD 21 (L) 07/15/2023       OZEMPIC, 0.25 OR 0.5 MG/DOSE, 2 MG/3ML Subcutaneous Solution Pen-injector, Inject 0.25 mg into the skin once a week., Disp: 3 mL, Rfl: 1  Insulin Pen Needle (PEN NEEDLES) 32G X 4 MM Does not apply Misc, Inject 1 each into the skin daily. , Disp: 100 each, Rfl: 1  ergocalciferol 1.25 MG (32665 UT) Oral Cap, Take 1 capsule (50,000 Units total) by mouth once a week. With food once weekly for 16 weeks total then begin OTC Vitamin D at 2000 units daily with food thereafter, Disp: 16 capsule, Rfl: 0  Sildenafil Citrate 25 MG Oral Tab, Take 1-2 tablets (25-50 mg total) by mouth daily as needed for Erectile Dysfunction. , Disp: 90 tablet, Rfl: 1    No current facility-administered medications on file prior to visit. ASSESSMENT  Analyzed weight data:       Diagnoses and all orders for this visit:    Therapeutic drug monitoring  -     semaglutide (OZEMPIC, 1 MG/DOSE,) 4 MG/3ML Subcutaneous Solution Pen-injector; Inject 1 mg into the skin once a week. -     OP REFERRAL TO DIAGNOSTIC SLEEP STUDY  -     Phentermine HCl 15 MG Oral Cap; Take 1 capsule (15 mg total) by mouth every morning. Morbid obesity with BMI of 50.0-59.9, adult (HCC)  -     semaglutide (OZEMPIC, 1 MG/DOSE,) 4 MG/3ML Subcutaneous Solution Pen-injector; Inject 1 mg into the skin once a week. -     OP REFERRAL TO DIAGNOSTIC SLEEP STUDY  -     Phentermine HCl 15 MG Oral Cap; Take 1 capsule (15 mg total) by mouth every morning. Vitamin D deficiency    Prediabetes  -     semaglutide (OZEMPIC, 1 MG/DOSE,) 4 MG/3ML Subcutaneous Solution Pen-injector; Inject 1 mg into the skin once a week.   -     OP REFERRAL TO DIAGNOSTIC SLEEP STUDY    Elevated liver enzymes  -     OP REFERRAL TO DIAGNOSTIC SLEEP STUDY    Snoring  -     OP REFERRAL TO DIAGNOSTIC SLEEP STUDY        PLAN  Continue with medications: phentermine 15mg   Will increase medications: ozempic 0.5mg x 4 weeks and then increase to 1mg weekly   --advised of side effects and adverse effects of this medication  Contradictions: victoza, avoid topamax hx of kidney    Reviewed labs  Continue with vitamin d high dose  prediabetic, reviewed last A1c 6.2% on 7/2023  Snoring, put in new orders for sleep study   Advised to monitor blood pressure and pulse at home/ given parameters to review and contact provider. Nutrition: low carb diet/ recommended to eat breakfast daily/ regular protein intake  Follow up with dietitian and psychologist as recommended. Discussed the role of sleep and stress in weight management. Counseled on comprehensive weight loss plan including attention to nutrition, exercise and behavior/stress management for success. See patient instruction below for more details. Discussed strategies to overcome barriers to successful weight loss and weight maintenance  FITTE: ACSM recommendations (150-300 minutes/ week in active weight loss)       There are no Patient Instructions on file for this visit. No follow-ups on file. Patient verbalizes understanding. Total time spent on chart review, pre-charting, obtaining history, counseling, and educating, reviewing labs was 26 minutes. Pt understands phone/video evaluation is not a substitute for face to face examination or emergency care. Pt advised to go to the ER or call 911 for worsening symptoms or acute distress. Please note that the following visit was completed using two-way, real-time interactive audio and/or video communication. This has been done in good ck to provide continuity of care in the best interest of the provider-patient relationship, due to the ongoing public health crisis/national emergency and because of restrictions of visitation. There are limitations of this visit as no physical exam could be performed. Every conscious effort was taken to allow for sufficient and adequate time. This billing was spent on reviewing labs, medications, radiology tests and decision making. Appropriate medical decision-making and tests are ordered as detailed in the plan of care above.      NOTE TO PATIENT: The 21st Century Cures Act makes clinical notes like these available to patients in the interest of transparency. Clinical notes are medical documents used by physicians and care providers to communicate with each other. These documents include medical language and terminology, abbreviations, and treatment information that may sound technical and at times possibly unfamiliar. In addition, at times, the verbiage may appear blunt or direct. These documents are one tool providers use to communicate relevant information and clinical opinions of the care providers in a way that allows common understanding of the clinical context.      Kike Modi, APRN  9/8/2023

## 2023-09-08 NOTE — PATIENT INSTRUCTIONS
Next steps:  1. Fill your prescribed medication and take as discussed and prescribed: phentermine 15mg, and ozempic 0.5mg x 3-4 weeks and then increase 1mg   2. Schedule a personal nutrition consultation with one of our registered dieticians     Please try to work on the following dietary changes:    1. Drink water with meals and throughout the day, cut down on soda and/or juice if consumed. Consider flavored water options like Bubbly, Spindrift, Hint and Marilou. 2.  Eat breakfast daily and focus on having protein with each meal, examples include: greek yogurt, cottage cheese, hard boiled egg, whole grain toast with peanut butter. 3.  Reduce refined carbohydrates and sugars which includes items such as sweets, as well as rice, pasta, and bread and make sure to choose whole grain options when having them with just 1 serving per meal about the size of your inner palm. 4.  Consume non starchy veggies daily working towards making them a good 50% of your daily food intake. Add them to lunch and dinner consistently. 5.  Start a daily probiotic: VSL#3 is recommended, (order on line at www.vsl3. com). Take 1 capsule daily with water for 30 days, then reduce to 1 every other day (this will reduce the cost). Capsules can be left out for 2 weeks, but then must be refrigerated. Please download adán My Fitness Maybelle Isidro! Or Net Diary to monitor daily dietary intake and you will be able to see if you are eating the right amount of calories or too much or too little which would hinder weight loss. Additionally this will help to see your daily carbohydrate and protein intake. When you set the adán up choose 1-2 lbs/week as a goal.  Keeping a paper food journal is an option as well to remain accountable for your choices- this is the start to mindful eating! A low calorie diet has been consistently shown to support weight loss. Continue or start exercising to help establish a routine.  If not already exercising begin with 1 day and progress as able with long-term goal of 30 minutes 5 days a week at a minimum. Meditation daily can help manage and control stress. Chronic stress can make weight loss difficult. Exercising is one way to help with stress, but meditation using the CALM Loly or another comparable alternative can be done in your home or place of work with little time commitment. This Loly can also help work on behavior change and improve sleep. Check out the segment under Calm Masterclass and listen to The 4 Pillars of Health. A great way to begin learning about the foundation of lifestyle with practical tips to use in your every day. Check out www.yourweightmatters. org blog for continued daily support and education along this weight loss journey! Patient Resources:     Personal Training/Fitness Classes/Health Coaching     Patsy Hernandez and Barry Mahoganymara @ [email or web address] Full fitness center with group fitness and personal training. [facility]. Health Coaching and Personal Training with Giancarlo Hernandezsafia at our [facility]- individual weekly coaching with option to add personal training and small group fitness classes targeted at weight loss- [phone] and/or email @ [email or web address]. [email or web address]  360FIT [place] [email or web address] Group Fitness [phone] and/or email Fritz Buckner at [email or web address]  [address] [facility] with multiple locations: Aetna (www.Flowity. Kaixin001), Eat The [place]-the DATONG Fitness (www.CorkShare. Kaixin001), Fit Body Bootcamp (www.Carnegie Mellon CyLabbodybootcamp.Kaixin001), Mandalay Sports Media (MSM) Fitness (www.[facility]. Kaixin001), The Exercise  (www.exercisecoach.Kaixin001)     Online Fitness  Fitness  on Whole Foods in 10 DVD series- www. rmsox37MQT. Kaixin001  Sit and Be Fit - Chair exercise series Www.sitandbefit. org  Hip Hop Fit with Farhan Ya at www.hiphopfit. net     Apps for on the Go Fitness  IMASTE 7 Minute Workout (orange box with white 7) - free on the go HIIT training loly  Peloton Loly @ www. VIDA Diagnostics     Nutrition Trackers and Tools  LoseIT! And My Fitness Pal apps and on line for tracking nutrition  NOOM - virtual health coaching  FitFoundation (healthy meals on the go) in [facility]-SCI @ www. fojfrnahcjxdu6f. Leighton Rowe MD @ www.Kirkland Northd.com and Keyana Castaneda (keto and low carb plans recommended) @ www. [email or web address].STL, Metabolic Meals @ www. MyMetabolicMeals. com - individual prepared meals to go  Escom, AQS, International Business Machines, Every Plate, Rewardable- on line meal delivery programs for preparation at home  [place] Acquisitions in [place] for homemade meals to go @ www.Philly Runway Thief. DoubleUp  Diet Doctor @ www. dietdoctor. com - low carb swaps  zwoor.com - meal prep and planning loly (www.yummly. com)     Stress Management/Behavior/Mindful Eating  CALM meditation loly (www.Integrated Plasmonics)  Headspace  Am I Hungry? Mindful eating virtual  loly  Www.yourweightmatters. org - Obesity Action Coalition sponsored Blog posts daily  Motivation loly (black box with white \")- daily supportive messages sent to your phone     Books/Video Education/Podcasts  Mindless Eating by Grey Goode  Why We Get Sick by Trevin Pino (a book about insulin resistance)  Atomic Habits by Monica Welsh (a book about taking small steps to promote greater behavior change)   Can't Hurt Me by Max Morrison (a book exploring the power of discipline in achieving your goals)  The End of Dieting: How to Live for Life by Dr. Dominique Barrios M.D. or listen to The [address] [facility] Episode 61: Understanding \"Nutritarian\" Eating w/Dr. Dominique Barrios  Your Body in Balance: The World Fuel Services Corporation of Food, Hormones, and Health by Dr. Julian Roy  The Menopause Diet Plan by [facility] HOSP AT [facility]  The Complete Guide to fasting by Dr. Hilaria Nieves, [facility] [facility] by Rehan Bardales, Ph.D, R.D.   Weight Loss Surgery Will Not Treat Food Addiction by Kay Dowling Leah Mccabe Ph.D  The [address] [place] [address] on plant based nutrition  Fed Up - documentary about obesity (Free on [place])  The Truth About Sugar - documentary on sugar (Free on [place], https://youtu. be/1A9qmhpSY0w)  The Dr. Alessandro Mckenna by Dr. Amador Alvarado MD  Fitlosophy Fitspiration - journal to better health (found at [employer] in fitness aisle)  What Happened to You?- a look at the impact trauma has on behavior written by Lian Barclay and Dr. Reed Malik Again by Kati Grimes - discovering your true self after trauma  Jonnie Mac talk on BookThatDoc, The Call to Courage  Podcasts: The Exam Room by the Physician's Committee, Nutrition Facts by Dr. Estela New    We are here to support you with weight loss, but please remember that you still need your primary care provider for your routine health maintenance.

## 2023-10-07 ENCOUNTER — OFFICE VISIT (OUTPATIENT)
Dept: SLEEP CENTER | Age: 38
End: 2023-10-07
Attending: NURSE PRACTITIONER
Payer: COMMERCIAL

## 2023-10-07 DIAGNOSIS — R06.83 SNORING: ICD-10-CM

## 2023-10-07 DIAGNOSIS — R74.8 ELEVATED LIVER ENZYMES: ICD-10-CM

## 2023-10-07 DIAGNOSIS — Z51.81 THERAPEUTIC DRUG MONITORING: Primary | ICD-10-CM

## 2023-10-07 DIAGNOSIS — R73.03 PREDIABETES: ICD-10-CM

## 2023-10-07 DIAGNOSIS — E66.01 MORBID OBESITY WITH BMI OF 50.0-59.9, ADULT (HCC): ICD-10-CM

## 2023-10-07 PROCEDURE — 95810 POLYSOM 6/> YRS 4/> PARAM: CPT

## 2023-10-09 ENCOUNTER — PATIENT MESSAGE (OUTPATIENT)
Dept: INTERNAL MEDICINE CLINIC | Facility: CLINIC | Age: 38
End: 2023-10-09

## 2023-10-09 NOTE — TELEPHONE ENCOUNTER
From: Adi Rene  To: Sarita Lizarragalpine  Sent: 10/9/2023 12:56 PM CDT  Subject: Sleep study result. In regards to your comment for the sleep study, I didn't see anyone after the study. She mentioned that I will likely be back for a \"cpap titration test?\" I don't know if that's something she wanted you to determine after seeing the results? She did have a doctor that I could follow up with but said I probably didn't need to see him specifically, and just meeting with the doctor that order the test to begin with would probably be fine. What do you feel the next step should be?

## 2023-10-10 PROBLEM — G47.33 OBSTRUCTIVE SLEEP APNEA: Status: ACTIVE | Noted: 2023-10-10

## 2023-10-11 ENCOUNTER — TELEPHONE (OUTPATIENT)
Facility: CLINIC | Age: 38
End: 2023-10-11

## 2023-10-11 DIAGNOSIS — G47.33 OSA (OBSTRUCTIVE SLEEP APNEA): Primary | ICD-10-CM

## 2023-10-17 ENCOUNTER — OFFICE VISIT (OUTPATIENT)
Facility: CLINIC | Age: 38
End: 2023-10-17
Payer: COMMERCIAL

## 2023-10-17 VITALS
HEIGHT: 74 IN | RESPIRATION RATE: 20 BRPM | SYSTOLIC BLOOD PRESSURE: 124 MMHG | OXYGEN SATURATION: 98 % | WEIGHT: 315 LBS | DIASTOLIC BLOOD PRESSURE: 80 MMHG | HEART RATE: 113 BPM | BODY MASS INDEX: 40.43 KG/M2

## 2023-10-17 DIAGNOSIS — E66.01 MORBID OBESITY WITH BMI OF 50.0-59.9, ADULT (HCC): ICD-10-CM

## 2023-10-17 DIAGNOSIS — G47.10 HYPERSOMNIA: ICD-10-CM

## 2023-10-17 DIAGNOSIS — G47.33 OBSTRUCTIVE SLEEP APNEA: Primary | ICD-10-CM

## 2023-10-17 PROCEDURE — 3079F DIAST BP 80-89 MM HG: CPT | Performed by: OTHER

## 2023-10-17 PROCEDURE — 99204 OFFICE O/P NEW MOD 45 MIN: CPT | Performed by: OTHER

## 2023-10-17 PROCEDURE — 3008F BODY MASS INDEX DOCD: CPT | Performed by: OTHER

## 2023-10-17 PROCEDURE — 3074F SYST BP LT 130 MM HG: CPT | Performed by: OTHER

## 2023-10-30 ENCOUNTER — OFFICE VISIT (OUTPATIENT)
Dept: INTERNAL MEDICINE CLINIC | Facility: CLINIC | Age: 38
End: 2023-10-30

## 2023-10-30 VITALS
RESPIRATION RATE: 16 BRPM | WEIGHT: 315 LBS | SYSTOLIC BLOOD PRESSURE: 122 MMHG | HEIGHT: 74 IN | BODY MASS INDEX: 40.43 KG/M2 | HEART RATE: 100 BPM | DIASTOLIC BLOOD PRESSURE: 78 MMHG

## 2023-10-30 DIAGNOSIS — E55.9 VITAMIN D DEFICIENCY: ICD-10-CM

## 2023-10-30 DIAGNOSIS — R74.8 ELEVATED LIVER ENZYMES: ICD-10-CM

## 2023-10-30 DIAGNOSIS — Z51.81 THERAPEUTIC DRUG MONITORING: Primary | ICD-10-CM

## 2023-10-30 DIAGNOSIS — E66.01 MORBID OBESITY WITH BMI OF 50.0-59.9, ADULT (HCC): ICD-10-CM

## 2023-10-30 DIAGNOSIS — R06.83 SNORING: ICD-10-CM

## 2023-10-30 DIAGNOSIS — R73.03 PREDIABETES: ICD-10-CM

## 2023-10-30 NOTE — PATIENT INSTRUCTIONS
Next steps:  1. Fill your prescribed medication and take as discussed and prescribed: ozempic 1mg weekly and phentermine 15mg   2. Schedule a personal nutrition consultation with one of our registered dieticians     Please try to work on the following dietary changes:  Daily protein recommendation to start:  grams  Daily carbohydrate: <220g  Daily calories: 2,800-2,900  1. Drink water with meals and throughout the day, cut down on soda and/or juice if consumed. Consider flavored water options like Bubbly, Spindrift, Hint and Marilou. 2.  Eat breakfast daily and focus on having protein with each meal, examples include: greek yogurt, cottage cheese, hard boiled egg, whole grain toast with peanut butter. 3.  Reduce refined carbohydrates and sugars which includes items such as sweets, as well as rice, pasta, and bread and make sure to choose whole grain options when having them with just 1 serving per meal about the size of your inner palm. 4.  Consume non starchy veggies daily working towards making them a good 50% of your daily food intake. Add them to lunch and dinner consistently. 5.  Start a daily probiotic: VSL#3 is recommended, (order on line at www.vsl3. com). Take 1 capsule daily with water for 30 days, then reduce to 1 every other day (this will reduce the cost). Capsules can be left out for 2 weeks, but then must be refrigerated. Please download adán My Fitness Maybelle Isidro! Or Net Diary to monitor daily dietary intake and you will be able to see if you are eating the right amount of calories or too much or too little which would hinder weight loss. Additionally this will help to see your daily carbohydrate and protein intake. When you set the adán up choose 1-2 lbs/week as a goal.  Keeping a paper food journal is an option as well to remain accountable for your choices- this is the start to mindful eating! A low calorie diet has been consistently shown to support weight loss.      Continue or start exercising to help establish a routine. If not already exercising begin with 1 day and progress as able with long-term goal of 30 minutes 5 days a week at a minimum. Meditation daily can help manage and control stress. Chronic stress can make weight loss difficult. Exercising is one way to help with stress, but meditation using the CALM Loly or another comparable alternative can be done in your home or place of work with little time commitment. This Loly can also help work on behavior change and improve sleep. Check out the segment under Calm Masterclass and listen to The 4 Pillars of Health. A great way to begin learning about the foundation of lifestyle with practical tips to use in your every day. Check out www.yourweightmatters. org blog for continued daily support and education along this weight loss journey! Patient Resources:     Personal Training/Fitness Classes/Health Coaching     Patsy Hernandez and Barry Coltentom @ [email or web address]/ Full fitness center with group fitness and personal training. Discount available as client of [facility]. Health Coaching and Personal Training with Michael Velazco at our [facility]- individual weekly coaching with option to add personal training and small group fitness classes targeted at weight loss- [phone] and/or email @ [email or web address]  360FIT [place] [email or web address] Group Fitness [phone] and/or email Marychuy Onofre at [email or web address]  [address] [facility] with multiple locations: Aet[employer] (www.MeetDoctor), Eat The Frog Fitness (www.Primcogent Solutions. DreamFace Interactive), Fit Body Bootcamp (www.Livelybodybootcamp.DreamFace Interactive), Neuroware.io (www.Next Caller. DreamFace Interactive), The Exercise  (www.exercisecoachAcrisure)     Online Fitness  Fitness  on [employer] in 10 DVD series- www. tvfpe65IOJ. DreamFace Interactive  Sit and Be Fit - Chair exercise series Www.sitandbefit. org  Hip Hop Fit with Farhan Ya at www.hiphopfit. net     Apps for on the Velti 7 Minute Workout (orange box with white 7) - free on the go HIIT training loly  Peloton Loly @ wwwDysonics     Nutrition Trackers and Tools  LoseIT! And My Fitness Pal apps and on line for tracking nutrition  NOOM - virtual health coaching  FitFoundation (healthy meals on the go) in [employer] @ www. qalorhhegpcrf2k. Karly Mccord MD @ www.Hydro-RuntromdFlowPay and Avila Martinez (keto and low carb plans recommended) @ www. FULSVP00.ETU, Metabolic Meals @ www. OneHealth Solutionsals. com - individual prepared meals to go  Airstone, Taggable, [employer], Every Plate, Identropy- on line meal delivery programs for preparation at home  AK J Kumar [employer] in [place] for homemade meals to go @ wwwGroup-IB. Elastagen  Diet Doctor @ www. dietdoctor. com - low carb swaps  Yummly - meal prep and planning loly (www.yummly. com)     Stress Management/Behavior/Mindful Eating  CALM meditation loly (www.calm. Elastagen)  Headspace  Am I Hungry? Mindful eating virtual  loly  Www.yourweightmatters. org - Obesity Action Coalition sponsored Blog posts daily  Motivation loly (black box with white \")- daily supportive messages sent to your phone     Books/Video Education/Podcasts  Mindless Eating by Humza Tillman  Why We Get Sick by Emilee Lee (a book about insulin resistance)  Atomic Habits by Lin Adams (a book about taking small steps to promote greater behavior change)   Can't Hurt Me by Kaveh Flower (a book exploring the power of discipline in achieving your goals)  The End of Dieting: How to Live for Life by Dr. Tracy Huang M.D. or listen to The [address] Episode 61: Understanding \"Nutritarian\" Eating w/Dr. Tracy Huang  Your Body in Balance:  The [employer] of Food, Hormones, and Health by Dr. Vivien Amato  The Menopause Diet Plan by Farhan Odom and [facility] AT [facility]  The Complete Guide to fasting by Dr. Zaki Hernandez, [address] [facility] by Zoë Hurley Juli Lopez, Ph.D, R.D. Weight Loss Surgery Will Not Treat Food Addiction by Ignacio Morales Ph.D  The [address] [place] [address] on plant based nutrition  Fed Up - documentary about obesity (Free on [place])  The Truth About Sugar - documentary on sugar (Free on Belanit, https://youtu. be/3S2qhanVC3a)  The Dr. Michelle Saleh by Dr. Susan Iniguez MD  Fitlosophy Fitspiration - journal to better health (found at [employer] in fitness aisle)  What Happened to You?- a look at the impact trauma has on behavior written by Gallo Gao and Dr. Keshia Conn Again by Gema Tijerina - discovering your true self after trauma  Shawna Patel talk on Narrative, The Call to [employer]  Podcasts: The Exam Room by the Physician's Committee, Nutrition Facts by Dr. Treva Steel    We are here to support you with weight loss, but please remember that you still need your primary care provider for your routine health maintenance.

## 2023-11-04 ENCOUNTER — OFFICE VISIT (OUTPATIENT)
Dept: SLEEP CENTER | Age: 38
End: 2023-11-04
Attending: Other
Payer: COMMERCIAL

## 2023-11-04 DIAGNOSIS — G47.33 OSA (OBSTRUCTIVE SLEEP APNEA): ICD-10-CM

## 2023-11-04 PROCEDURE — 95811 POLYSOM 6/>YRS CPAP 4/> PARM: CPT

## 2023-12-05 ENCOUNTER — TELEPHONE (OUTPATIENT)
Facility: CLINIC | Age: 38
End: 2023-12-05

## 2023-12-05 DIAGNOSIS — G47.33 OSA (OBSTRUCTIVE SLEEP APNEA): Primary | ICD-10-CM

## 2023-12-05 DIAGNOSIS — R73.03 PREDIABETES: ICD-10-CM

## 2023-12-05 DIAGNOSIS — R06.83 SNORING: ICD-10-CM

## 2023-12-05 DIAGNOSIS — E66.01 MORBID OBESITY WITH BMI OF 50.0-59.9, ADULT (HCC): ICD-10-CM

## 2023-12-05 NOTE — TELEPHONE ENCOUNTER
Diagnosis: Obstructive Sleep Apnea G47.33   RECOMMENDATIONS:   1. The patient should be prescribed CPAP at 14 cm H2O, with humidity at 3 and EPR on.   2. The patient was fitted with a ResMed AirFit F20 mask, size Large. 3. The patient should avoid alcohol and sedative medications, as these may worsen severity of symptoms. 4. The patient should avoid drowsy driving. 5. Patient may follow up with the referring provider. Detailed Trutap message sent to pt, cpap machine ordered to [address] [facility] [phone]. DME will verify insurance and once approved will contact pt to arrange delivery and instructions. Pt instructed to follow up with Dr. Jasmyn Medrano once pt starts PAP therapy per insurance compliance requirement. Pt verbalized understanding of instructions and agrees with the plan.    Nurse faxed cpap orders, sleep study, & F2F to [address] w/confirmation  [phone] (home)

## 2023-12-12 ENCOUNTER — PATIENT MESSAGE (OUTPATIENT)
Dept: INTERNAL MEDICINE CLINIC | Facility: CLINIC | Age: 38
End: 2023-12-12

## 2023-12-12 DIAGNOSIS — Z51.81 THERAPEUTIC DRUG MONITORING: ICD-10-CM

## 2023-12-12 DIAGNOSIS — R73.03 PREDIABETES: ICD-10-CM

## 2023-12-12 DIAGNOSIS — E66.01 MORBID OBESITY WITH BMI OF 50.0-59.9, ADULT (HCC): ICD-10-CM

## 2023-12-12 RX ORDER — SEMAGLUTIDE 1.34 MG/ML
1 INJECTION, SOLUTION SUBCUTANEOUS WEEKLY
Qty: 3 ML | Refills: 2 | Status: SHIPPED | OUTPATIENT
Start: 2023-12-12

## 2023-12-12 RX ORDER — PHENTERMINE HYDROCHLORIDE 15 MG/1
15 CAPSULE ORAL EVERY MORNING
Qty: 30 CAPSULE | Refills: 2 | Status: SHIPPED | OUTPATIENT
Start: 2023-12-12

## 2023-12-12 NOTE — TELEPHONE ENCOUNTER
From: Bonifacio So  To: Umberto Crespo  Sent: 12/12/2023 9:17 AM CST  Subject: Prescription refills question    At my last appointment you were showing that I still had 1 more refill on both my ozempic and phentermine, but willa said there are no more refills. As it stands now I have enough ozempic for 1 more week, and enough phentermine to finish the month. I have a scheduled follow up with you on Feb 12th. In order to have enough meds to make it to that appt, I would need 2 refills of each authorized.

## 2023-12-12 NOTE — TELEPHONE ENCOUNTER
Requesting   Requested Prescriptions     Pending Prescriptions Disp Refills    Phentermine HCl 15 MG Oral Cap 30 capsule 2     Sig: Take 1 capsule (15 mg total) by mouth every morning. semaglutide (OZEMPIC, 1 MG/DOSE,) 4 MG/3ML Subcutaneous Solution Pen-injector 3 mL 2     Sig: Inject 1 mg into the skin once a week.      LOV: 10/30/23  RTC: 3 months  Filled: phen 9/15/23 #30 with 2 refills  Ozempic 9/26/23 #3 with 2 refills    Future Appointments   Date Time Provider Alfred Ingram   2/5/2024  9:00 AM Modesto Beard DO [facility] [place]   2/12/2024  4:00 PM Valente Abbott APRN [facility] [address]

## 2023-12-23 ENCOUNTER — HOSPITAL ENCOUNTER (OUTPATIENT)
Age: 38
Discharge: HOME | End: 2023-12-23
Payer: COMMERCIAL

## 2023-12-23 VITALS
HEIGHT: 75 IN | OXYGEN SATURATION: 98 % | WEIGHT: 315 LBS | RESPIRATION RATE: 20 BRPM | DIASTOLIC BLOOD PRESSURE: 80 MMHG | BODY MASS INDEX: 39.17 KG/M2 | SYSTOLIC BLOOD PRESSURE: 135 MMHG | TEMPERATURE: 98 F | HEART RATE: 83 BPM

## 2023-12-23 DIAGNOSIS — U07.1 COVID-19: Primary | ICD-10-CM

## 2023-12-23 LAB — SARS-COV-2 RNA RESP QL NAA+PROBE: DETECTED

## 2023-12-23 PROCEDURE — 99213 OFFICE O/P EST LOW 20 MIN: CPT

## 2023-12-23 PROCEDURE — 99214 OFFICE O/P EST MOD 30 MIN: CPT

## 2023-12-23 RX ORDER — AZELASTINE 1 MG/ML
1 SPRAY, METERED NASAL 2 TIMES DAILY
Qty: 30 ML | Refills: 0 | Status: SHIPPED | OUTPATIENT
Start: 2023-12-23

## 2023-12-23 RX ORDER — ALBUTEROL SULFATE 90 UG/1
2 AEROSOL, METERED RESPIRATORY (INHALATION) EVERY 4 HOURS PRN
Qty: 18 G | Refills: 0 | Status: SHIPPED | OUTPATIENT
Start: 2023-12-23

## 2023-12-23 NOTE — ED INITIAL ASSESSMENT (HPI)
C/o congested cough for 3 days. Pt reports body aches, burning behind nose. Pt denies anyone else sick, reports travel to [place]. Pt reports otc meds used.

## 2024-02-05 ENCOUNTER — OFFICE VISIT (OUTPATIENT)
Facility: CLINIC | Age: 39
End: 2024-02-05
Payer: COMMERCIAL

## 2024-02-05 VITALS
DIASTOLIC BLOOD PRESSURE: 76 MMHG | HEART RATE: 88 BPM | BODY MASS INDEX: 39.17 KG/M2 | HEIGHT: 75 IN | SYSTOLIC BLOOD PRESSURE: 132 MMHG | OXYGEN SATURATION: 9 % | WEIGHT: 315 LBS | RESPIRATION RATE: 20 BRPM

## 2024-02-05 DIAGNOSIS — G47.33 OBSTRUCTIVE SLEEP APNEA: Primary | ICD-10-CM

## 2024-02-05 DIAGNOSIS — G47.10 HYPERSOMNIA: ICD-10-CM

## 2024-02-05 DIAGNOSIS — E66.01 MORBID OBESITY WITH BMI OF 50.0-59.9, ADULT (HCC): ICD-10-CM

## 2024-02-05 DIAGNOSIS — R73.03 PREDIABETES: ICD-10-CM

## 2024-02-05 PROCEDURE — 99214 OFFICE O/P EST MOD 30 MIN: CPT | Performed by: OTHER

## 2024-02-05 NOTE — PROGRESS NOTES
[facility]  SLEEP PROGRESS NOTE        HPI:   This is a 38 year old male coming in for   Chief Complaint   Patient presents with    Obstructive Sleep Apnea (CHRISTINE)     Dme:  Home Medical  Full face mask       HPI:     This is a 38 year old male who presents with the following symptoms, risk factors, behaviors or other items associated with sleep problems.    Sleep Apnea:   wakes with dry mouth; mouth breathing; nasal congestion; overweight; previous sleep study; current CPAP use  Insomnia:  no symptoms of Insomnia  Restless Leg:  urge to move legs when trying to sleep; tingling or crawly feeling in the legs; urge to move is worse when seated or lying  Parasomnias:   no symptoms of parasomnias  Daytime Problems:  sleepiness; fatigue; previous sleep study    The patient's [place] Sleepiness score is 7/24.    Ag Pryor  2023 - 2024  Patient ID: [id number]  : 1985  Age: 38 years  Gender: Male  27.5 Jamilah Mayes [facility] M-Audio  [address] [place] [address], [address]  Compliance Report  Usage 2023 - 2024  Usage days 47/48 days (98%)  >= 4 hours 47 days (98%)  < 4 hours 0 days (0%)  Usage hours 307 hours 41 minutes  Average usage (total days) 6 hours 25 minutes  Average usage (days used) 6 hours 33 minutes  Median usage (days used) 6 hours 27 minutes  Total used hours (value since last reset - 2024) 307 hours  AirSense 11 AutoSet  Serial number [id number]  Mode CPAP  Set pressure 14 cmH2O  EPR Fulltime  EPR level 2  Therapy  Leaks - L/min Median: 0.8 95th percentile: 12.9 Maximum: 22.7  Events per hour AI: 0.5 HI: 0.3 AHI: 0.8  Apnea Index Central: 0.1 Obstructive: 0.3 Unknown: 0.0  RERA Index 0.1  Cheyne-Wang respiration (average duration per night) 0 minutes (0%)    DME: HME  FULL FACE MASK    Still reporting restless sleep, waking several times  But does feel more refreshed, easier to wake up  No daytime naps  In bed at 9, SL 30min-60 min  Wakes  around 3am  , some administrative    Patient: Sleep review of systems today: see form.      Pt  PCP:  Pati Wallace MD  No referring provider defined for this encounter.           No data to display                    Past Medical History:   Diagnosis Date    Abnormal EKG 12/01/2018    Depression early 2019    Kidney stone 05/22/2018    Lack of energy 12/01/2018    Mood swings 12/01/2018    Obesity whole life    Sleep apnea 10/7/23     Past Surgical History:   Procedure Laterality Date    CHOLECYSTECTOMY  01/2019    REMOVAL GALLBLADDER       Social History:  Social History     Social History Narrative    Not on file     Social History     Socioeconomic History    Marital status:    Tobacco Use    Smoking status: Never    Smokeless tobacco: Never   Vaping Use    Vaping Use: Never used   Substance and Sexual Activity    Alcohol use: Not Currently     Alcohol/week: 1.0 standard drink of alcohol     Types: 1 Standard drinks or equivalent per week     Comment: occ    Drug use: Never   Other Topics Concern    Caffeine Concern No    Exercise Yes    Seat Belt Yes    Special Diet No    Stress Concern No    Weight Concern Yes     Family History:  Family History   Problem Relation Age of Onset    Diabetes Mother     Heart Disease Maternal Grandfather     Heart Disease Paternal Grandfather     Cancer Neg     Stroke Neg      Allergies:  Allergies   Allergen Reactions    Cephalexin HIVES     Current Meds:  Current Outpatient Medications   Medication Sig Dispense Refill    Phentermine HCl 15 MG Oral Cap Take 1 capsule (15 mg total) by mouth every morning. 30 capsule 2    semaglutide (OZEMPIC, 1 MG/DOSE,) 4 MG/3ML Subcutaneous Solution Pen-injector Inject 1 mg into the skin once a week. 3 mL 2    Insulin Pen Needle (PEN NEEDLES) 32G X 4 MM Does not apply Misc Inject 1 each into the skin daily. 100 each 1    Sildenafil Citrate 25 MG Oral Tab Take 1-2 tablets (25-50 mg total) by mouth daily as needed for Erectile  Dysfunction. 90 tablet 1    azelastine 0.1 % Nasal Solution 1 spray by Nasal route 2 (two) times daily. 30 mL 0    albuterol 108 (90 Base) MCG/ACT Inhalation Aero Soln Inhale 2 puffs into the lungs every 4 (four) hours as needed for Wheezing. 18 g 0    ergocalciferol 1.25 MG (54708 UT) Oral Cap Take 1 capsule (50,000 Units total) by mouth once a week. With food once weekly for 16 weeks total then begin OTC Vitamin D at 2000 units daily with food thereafter 16 capsule 0      Counseling given: Not Answered         Problem List:  Patient Active Problem List   Diagnosis    Erectile dysfunction    Acne    Vitamin D deficiency    Therapeutic drug monitoring    History of kidney stones    Morbid obesity with BMI of 50.0-59.9, adult (HCC)    Elevated liver enzymes    Elevated hemoglobin A1c    Prediabetes    Obstructive sleep apnea    Hypersomnia       REVIEW OF SYSTEMS:   Review of Systems    EXAM:   /76 (BP Location: Right arm, Patient Position: Sitting, Cuff Size: adult)   Pulse 88   Resp 20   Ht 6' 3\" (1.905 m)   Wt (!) 379 lb (171.9 kg)   SpO2 (!) 9%   BMI 47.37 kg/m²  Estimated body mass index is 47.37 kg/m² as calculated from the following:    Height as of this encounter: 6' 3\" (1.905 m).    Weight as of this encounter: 379 lb (171.9 kg).   Neck in inches:      Wt Readings from Last 6 Encounters:   02/05/24 (!) 379 lb (171.9 kg)   12/23/23 (!) 382 lb (173.3 kg)   10/30/23 (!) 404 lb (183.3 kg)   10/17/23 (!) 407 lb (184.6 kg)   08/11/23 (!) 445 lb (201.9 kg)   07/13/23 (!) 464 lb (210.5 kg)     BP Readings from Last 3 Encounters:   02/05/24 132/76   12/23/23 135/80   10/30/23 122/78     Pulse Readings from Last 3 Encounters:   02/05/24 88   12/23/23 83   10/30/23 100     SpO2 Readings from Last 3 Encounters:   02/05/24 (!) 9%   12/23/23 98%   10/17/23 98%      Ideal body weight: 84.5 kg (186 lb 4.6 oz)  Adjusted ideal body weight: 119.5 kg (263 lb 6 oz)    Vital signs reviewed.  Physical  Exam    ASSESSMENT AND PLAN:   1. Obstructive sleep apnea  Patient is using and benefiting from regular cpap use.  Patient was instructed to clean equipment on a weekly basis.  Patient was instructed to keep up to date with supplies.  Patient was informed to avoid drowsy driving, or using heavy machinery.  Patient was instructed to followup on a semi-annual basis.   2. Hypersomnia  improved  3. Prediabetes    4. Morbid obesity with BMI of 50.0-59.9, adult ([facility])  Working on wt loss  There are no Patient Instructions on file for this visit.    Independent interpretation of Sleep Download as defined above.  Continue with Rx management of Sleep apnea with PAP therapy.    COMPLIANCE is required by insurance for 4 hours a night most nights of the week.    Advised if still with sleep apnea and not using CPAP has a 7 fold increase in risk of heart attack, stroke, abnormal heart rhythm  and death,  increased risk of driving accidents.     Advised to refrain from driving when sleepy.      Recommend weight loss, and maintain and optimal BMI with Exercise 30 minutes most days to target heart rate .     Advised patient to change filters,masks,hoses  and tubes and equiptment on a  regular schedule.    Filters and seals shall be changed every 1 month,  Hoses every 3 months,   CPAP mask and humidifier chamber changed every 6 month  with the durable medical equipment provider.         Meds & Refills for this Visit:  Requested Prescriptions      No prescriptions requested or ordered in this encounter       Outcome: Parent verbalizes understanding. Parent is notified to call with any questions, complications, allergies, or worsening or changing symptoms.  Parent is to call with any side effects or complications from the treatments as a result of today.     \" This note was created utilizing Dragon speech recognition software.  Please excuse any grammatical errors. Call my office if you have any questions regarding this note. \"     [place]  DO Kisha  2/5/2024  9:17 AM

## 2024-02-12 ENCOUNTER — OFFICE VISIT (OUTPATIENT)
Dept: INTERNAL MEDICINE CLINIC | Facility: CLINIC | Age: 39
End: 2024-02-12
Payer: COMMERCIAL

## 2024-02-12 VITALS
HEART RATE: 94 BPM | BODY MASS INDEX: 40.43 KG/M2 | WEIGHT: 315 LBS | RESPIRATION RATE: 16 BRPM | SYSTOLIC BLOOD PRESSURE: 120 MMHG | DIASTOLIC BLOOD PRESSURE: 84 MMHG | HEIGHT: 74 IN

## 2024-02-12 DIAGNOSIS — E66.01 MORBID OBESITY WITH BMI OF 50.0-59.9, ADULT (HCC): ICD-10-CM

## 2024-02-12 DIAGNOSIS — Z51.81 THERAPEUTIC DRUG MONITORING: Primary | ICD-10-CM

## 2024-02-12 DIAGNOSIS — R06.83 SNORING: ICD-10-CM

## 2024-02-12 DIAGNOSIS — E55.9 VITAMIN D DEFICIENCY: ICD-10-CM

## 2024-02-12 DIAGNOSIS — R74.8 ELEVATED LIVER ENZYMES: ICD-10-CM

## 2024-02-12 DIAGNOSIS — R73.03 PREDIABETES: ICD-10-CM

## 2024-02-12 NOTE — PATIENT INSTRUCTIONS
Next steps:  1.  Fill your prescribed medication and take as discussed and prescribed: ozempic 1mg weekly and phentermine 15mg   2.  Schedule a personal nutrition consultation with one of our registered dieticians     Please try to work on the following dietary changes:  Daily protein recommendation to start:  grams  Daily carbohydrate: <200g  Daily calories: 2,700-2,800  1.  Drink water with meals and throughout the day, cut down on soda and/or juice if consumed. Consider flavored water options like Bubbly, Spindrift, Hint and Marilou.  2.  Eat breakfast daily and focus on having protein with each meal, examples include: greek yogurt, cottage cheese, hard boiled egg, whole grain toast with peanut butter.   3.  Reduce refined carbohydrates and sugars which includes items such as sweets, as well as rice, pasta, and bread and make sure to choose whole grain options when having them with just 1 serving per meal about the size of your inner palm.  4.  Consume non starchy veggies daily working towards making them a good 50% of your daily food intake. Add them to lunch and dinner consistently.  5.  Start a daily probiotic: VSL#3 is recommended, (order on line at www.vsl3.com). Take 1 capsule daily with water for 30 days, then reduce to 1 every other day (this will reduce the cost). Capsules can be left out for 2 weeks, but then must be refrigerated.      Please download adán My Fitness Pal, LoseIt! Or Net Diary to monitor daily dietary intake and you will be able to see if you are eating the right amount of calories or too much or too little which would hinder weight loss. Additionally this will help to see your daily carbohydrate and protein intake. When you set the adán up choose 1-2 lbs/week as a goal.  Keeping a paper food journal is an option as well to remain accountable for your choices- this is the start to mindful eating! A low calorie diet has been consistently shown to support weight loss.     Continue or  start exercising to help establish a routine. If not already exercising begin with 1 day and progress as able with long-term goal of 30 minutes 5 days a week at a minimum.     Meditation daily can help manage and control stress. Chronic stress can make weight loss difficult.  Exercising is one way to help with stress, but meditation using the CALM Loly or another comparable alternative can be done in your home or place of work with little time commitment. This Loly can also help work on behavior change and improve sleep. Check out the segment under Calm Masterclass and listen to The 4 Pillars of Health. A great way to begin learning about the foundation of lifestyle with practical tips to use in your every day.     Check out www.yourweightmatters.org blog for continued daily support and education along this weight loss journey!    Patient Resources:     Personal Training/Fitness Classes/Health Coaching     [facility] @ [email or web address]-center Full fitness center with group fitness and personal training. Discount available as client of [facility] [employer] [facility].  Health Coaching and Personal Training with Jeaneth Horowitz at our [facility]- individual weekly coaching with option to add personal training and small group fitness classes targeted at weight loss- [phone] and/or email @ [email or web address]  [employer] [place] [email or web address] Group Fitness [phone] and/or email Jillian at [email or web address]  [facility] Fitness Centers with multiple locations: Trips n Salsa (www.Existence Before Essence), Eat The Frog Fitness (www.Binpress.NewHound), Fit Body Bootcamp (www.ripplrr incbodybootAngioScorep.NewHound), Amino Apps Fitness (www.Mitoo Sports.NewHound), The Exercise  (www.exercisecoach.NewHound)     Online Fitness  Fitness  on Grupo LeÃ±oso SACV  Fit in 10 DVD series- www.yylmx05CIW.com  Sit and Be Fit - Chair exercise series  Www.sitandbefit.org  Hip Hop Fit with Farhan Ya at www.hiphopfit.net     Apps for on the Go Fitness  EasyCopay 7 Minute Workout (orange box with white 7) - free on the go HIIT training loly  Peloton Loly @ www.onepeloton.com     Nutrition Trackers and Tools  LoseIT! And My Fitness Pal apps and on line for tracking nutrition  NOOM - virtual health coaching  FitFoundation (healthy meals on the go) in [place] @ www.bvpiyxllbsegl4rEMOSpeech  Jennifer MAY @ wwwfarmaciamarketbistromd.com and Xdhrqq42 (keto and low carb plans recommended) @ www.eklmlv71.com, Metabolic Meals @ www.MyMetabolicMeals.com - individual prepared meals to go  Gobble, Blue Apron, Home , Every Plate, Sunbasket- on line meal delivery programs for preparation at home  Meal Village in [place] for homemade meals to go @ www.mealAdvanced BioNutritionage.YouOS  Diet Doctor @ www.dietdoctor.YouOS - low carb swaps  Yummly - meal prep and planning loly (www.yummly.com)     Stress Management/Behavior/Mindful Eating  CALM meditation loly (www.calm.com)  Headspace  Am I Hungry? Mindful eating virtual  loly  Www.yourweightmatters.org - Obesity Action Coalition sponsored Blog posts daily  Motivation loly (black box with white \")- daily supportive messages sent to your phone     Books/Video Education/Podcasts  Mindless Eating by Arcadio Randolph  Why We Get Sick by Herman Sweet (a book about insulin resistance)  Atomic Habits by Ag Escobar (a book about taking small steps to promote greater behavior change)   Can't Hurt Me by Favian Babin (a book exploring the power of discipline in achieving your goals)  The End of Dieting: How to Live for Life by Dr. Thiago Sanders M.D. or listen to The Social Tools Podcast Episode 63: Understanding \"Nutritarian\" Eating w/Dr. Thiago Sanders  Your Body in Balance: The New Science of Food, Hormones, and Health by Dr. Kar Faustin  The Menopause Diet Plan by Daisy Brown and Alpa Rodriguez  The Complete Guide to fasting by Dr. Calvo  Sugar, Salt & Fat by Ana Cristina  Delaney, Ph.D, R.D.  Weight Loss Surgery Will Not Treat Food Addiction by Dania Jin Ph.D  The Game Changers- Zuujitix Documentary on plant based nutrition  Fed Up - documentary about obesity (Free on Utube)  The Truth About Sugar - documentary on sugar (Free on Utube, https://youtu.be/7K1yyruHG7r)  The Dr. Romero T5 Wellness Plan by Dr. Benito Romero MD  Fitlosophy Fitspiration - journal to better health (found at [employer] in fitness aisle)  What Happened to You?- a look at the impact trauma has on behavior written by Sharonda Ayers and Dr. Augusto Brewer  Whole Again by Dillon Damon - discovering your true self after trauma  Meryl Gonzalez talk on 8thBridge, The Call to Courage  Podcasts: The Exam Room by the Physician's Committee, Nutrition Facts by Dr. Mast    We are here to support you with weight loss, but please remember that you still need your primary care provider for your routine health maintenance.

## 2024-02-23 ENCOUNTER — TELEPHONE (OUTPATIENT)
Dept: INTERNAL MEDICINE CLINIC | Facility: CLINIC | Age: 39
End: 2024-02-23

## 2024-03-04 DIAGNOSIS — R73.03 PREDIABETES: ICD-10-CM

## 2024-03-04 DIAGNOSIS — Z51.81 THERAPEUTIC DRUG MONITORING: ICD-10-CM

## 2024-03-04 DIAGNOSIS — E66.01 MORBID OBESITY WITH BMI OF 50.0-59.9, ADULT (HCC): ICD-10-CM

## 2024-03-06 RX ORDER — SEMAGLUTIDE 1.34 MG/ML
1 INJECTION, SOLUTION SUBCUTANEOUS WEEKLY
Qty: 9 ML | Refills: 0 | Status: SHIPPED | OUTPATIENT
Start: 2024-03-06

## 2024-03-06 NOTE — TELEPHONE ENCOUNTER
Requesting   Requested Prescriptions     Pending Prescriptions Disp Refills    semaglutide (OZEMPIC, 1 MG/DOSE,) 4 MG/3ML Subcutaneous Solution Pen-injector 3 mL 2     Sig: Inject 1 mg into the skin once a week.     LOV: 2/12/24  RTC: 3 months  Filled: 12/12/23 #3 with 2 refills    Future Appointments   Date Time Provider Department Center   6/17/2024  4:00 PM Ashley Abbott APRN [facility] [address]   8/5/2024 11:15 AM Modesto Beard DO [facility] [place]

## 2024-04-03 ENCOUNTER — MED REC SCAN ONLY (OUTPATIENT)
Facility: CLINIC | Age: 39
End: 2024-04-03

## 2024-04-09 NOTE — PROGRESS NOTES
HISTORY OF PRESENT ILLNESS  Patient presents with:  Weight Check: lost 5 pounds    Jenn Her is a 34year old male here for follow up with medical weight loss program for the treatment of overweight, obesity, or morbid obesity.  Patient has lost -#5  lbs si Nikiake vitamin d   Stress level: 3/10  Sleep hours and integrity:  6-7 Hours per night    MEDICAL HISTORY  PMH reviewed:   Cardiac disorders:negative  Diabetes: negative  Thyroid disease: negative  Renal disease: negative   Kidney stones: + may 2018  Liver diseas Lab Results   Component Value Date    CHOLEST 188 10/27/2018    TRIG 93 10/27/2018    HDL 49 10/27/2018     (H) 10/27/2018    VLDL 19 10/27/2018    NONHDLC 139 (H) 10/27/2018     Lab Results   Component Value Date    B12 521 12/04/2018     Lab R labs: vitamin d deficiency 9.9 (treated by PCP)    EKG in office completed today shows NSR, nonspecific T wave abnormality, rate 84, nl intervals, no acute ST changes, [id number]. abnormal EKG.  No previous EKG to compare- ECHO was abnormal--> referral to see C lbs of weight loss!     PLAN:  Continue with medications: victoza 1.8mg and add metformin 750mg 1 tablet in the morning and 1 tablet before dinner  See Cardiologist   Follow up with me in 1 month  Schedule follow up appointments:  Erlin Jama \"Headspace\" which helps with mindfulness, meditation, clarity, sleep, and jovon to your daily life. Return in about 4 weeks (around 2/2/2019). Patient verbalizes understanding.     JEREMY Ellsworth

## 2024-04-23 DIAGNOSIS — Z51.81 THERAPEUTIC DRUG MONITORING: ICD-10-CM

## 2024-04-23 DIAGNOSIS — E66.01 MORBID OBESITY WITH BMI OF 50.0-59.9, ADULT (HCC): ICD-10-CM

## 2024-04-23 RX ORDER — PHENTERMINE HYDROCHLORIDE 15 MG/1
15 CAPSULE ORAL EVERY MORNING
Qty: 30 CAPSULE | Refills: 1 | Status: SHIPPED | OUTPATIENT
Start: 2024-04-23

## 2024-04-23 NOTE — TELEPHONE ENCOUNTER
Requesting   Requested Prescriptions     Pending Prescriptions Disp Refills    PHENTERMINE HCL 15 MG Oral Cap [Pharmacy Med Name: PHENTERMINE HCL 15MG CAPSULES] 30 capsule 0     Sig: TAKE ONE CAPSULE BY MOUTH EVERY MORNING       LOV: 02/12/2024  RTC: in about 3 months  Filled: 12/12/2023 #30 with 2 refills    Future Appointments   Date Time Provider Department Center   6/17/2024  4:00 PM Ashley Abbott APRN [facility] [address]   8/5/2024 11:15 AM Modesto Beard DO [facility] [place]

## 2024-05-24 ENCOUNTER — TELEPHONE (OUTPATIENT)
Dept: INTERNAL MEDICINE CLINIC | Facility: CLINIC | Age: 39
End: 2024-05-24

## 2024-05-24 NOTE — TELEPHONE ENCOUNTER
Got a fax for PA   Ozempic 1 mg   Can enter on CMM   Key : [id number]    Pt is not diabetic, will not cover. Please advise.

## 2024-08-28 NOTE — PROGRESS NOTES
[facility]  SLEEP PROGRESS NOTE        HPI:   This is a 39 year old male coming in for No chief complaint on file.  6 month follow up    HPI:     Patient: Sleep review of systems today: see form.  Not having issues  Also not having any change in sleep after starting CPAP    Avenal score: 10/24    In bed 9pm  Out of bed 3:30am-  for CTA   SL 30-60 min, can be less  Nighttime awakenings- about every hour, not related to using RR. Feels hot occasionally. Awake for 5-10 min.     Denies teeth grinding, leg cramps, restless legs, headaches, dry mouth, tinnitus, sleep walking, sleep talking, sleep paralysis, sleep attacks, cataplexy  He does toss/turn frequently in his sleep.     Was doing well with Ozempic and phentermine. No longer being covered.   464 max, 379 min.    DME company: [employer]  Mask: Ag Garrison  2024 - 2024  Patient ID: [id number]  : 1985  Age: 39 years  Gender: Male  27.5 Jamilah Durant [facility]  [address] [facility], [address]  Compliance Report  Usage 2024 - 2024  Usage days 66/90 days (73%)  >= 4 hours 65 days (72%)  < 4 hours 1 days (1%)  Usage hours 392 hours 35 minutes  Average usage (total days) 4 hours 22 minutes  Average usage (days used) 5 hours 57 minutes  Median usage (days used) 5 hours 43 minutes  Total used hours (value since last reset - 2024) 1,370 hours  AirSense 11 AutoSet  Serial number [id number]  Mode CPAP  Set pressure 14 cmH2O  EPR Fulltime  EPR level 2  Therapy  Leaks - L/min Median: 2.9 95th percentile: 14.3 Maximum: 19.9  Events per hour AI: 0.1 HI: 0.1 AHI: 0.2  Apnea Index Central: 0.0 Obstructive: 0.0 Unknown: 0.0  RERA Index 0.1  Cheyne-Wang respiration (average duration per night) 0 minutes (0%)       2023 CPAP Tx  Respiratory Analysis: Monitoring revealed resolution of respiratory events with CPAP at 14 cm H2O. Supine REM was observed at the final pressure. The  Apnea/Hypopnea index (AHI) at the final pressure setting was 11.2. The central sleep apnea index was 0. The oxygen tino was 90.0% and the patient spent 3.7% of sleep time spent with oxygen levels below 88%.     10/7/2023 PSG   Respiratory Analysis: The Apnea-Hypopnea Index (AHI) was 24.4 events per hour. REM related AHI was 20.6 events per hour. Supine related AHI was 31.1. Lateral related AHI was 7.0. The Central Apnea Index was 0. The oxygen saturation tino was 76.0% and patient spent 7.3% with saturations less than 88%.         Last CBC  Lab Results   Component Value Date    WBC 11.9 (H) 08/11/2023    RBC 5.48 08/11/2023    HGB 14.7 08/11/2023    HCT 43.5 08/11/2023    MCV 82.0 08/11/2023    MCV 79.4 (L) 08/11/2023    MCH 26.8 08/11/2023    MCHC 32.0 08/11/2023    MCHC 33.8 08/11/2023    RDW 14.0 08/11/2023    .0 08/11/2023        Last CMP/BMP  Lab Results   Component Value Date     (H) 07/15/2023    BUN 8 07/15/2023    BUNCREA NOT APPLICABLE 07/15/2023    CREATSERUM 0.80 07/15/2023    ANIONGAP 5 01/08/2021    GFRNAA 116 05/16/2022    GFRAA 135 05/16/2022    CA 9.2 07/15/2023    OSMOCALC 285 01/08/2021    ALKPHO 75 07/15/2023    AST 64 (H) 07/15/2023    ALT 80 (H) 07/15/2023    BILT 0.9 07/15/2023    TP 6.6 07/15/2023    ALB 4.2 07/15/2023    GLOBULIN 2.4 07/15/2023    AGRATIO 1.8 07/15/2023     07/15/2023    K 4.2 07/15/2023     07/15/2023    CO2 28 07/15/2023       Last iron panel  No results found for: \"IRON\", \"IRONTOT\"  No results found for: \"MUKUND\"      Pt  PCP:  Pati Wallace MD  No referring provider defined for this encounter.           No data to display                  Past Medical History:    Abnormal EKG    Depression    Kidney stone    Lack of energy    Mood swings    Obesity    Sleep apnea     Past Surgical History:   Procedure Laterality Date    Cholecystectomy  01/2019    Removal gallbladder       Social History:  Social History     Social History Narrative    Not on  file     Social History     Socioeconomic History    Marital status:    Tobacco Use    Smoking status: Never    Smokeless tobacco: Never   Vaping Use    Vaping status: Never Used   Substance and Sexual Activity    Alcohol use: Not Currently     Alcohol/week: 1.0 standard drink of alcohol     Types: 1 Standard drinks or equivalent per week     Comment: occ    Drug use: Never   Other Topics Concern    Caffeine Concern No    Exercise Yes    Seat Belt Yes    Special Diet No    Stress Concern No    Weight Concern Yes     Family History:  Family History   Problem Relation Age of Onset    Diabetes Mother     Heart Disease Maternal Grandfather     Heart Disease Paternal Grandfather     Cancer Neg     Stroke Neg      Allergies:  Allergies   Allergen Reactions    Cephalexin HIVES     Current Meds:  Current Outpatient Medications   Medication Sig Dispense Refill    traZODone 50 MG Oral Tab Take 1 tablet (50 mg total) by mouth nightly as needed for Sleep. Can take up to 3 tabs at a time if needed. 90 tablet 1    Sildenafil Citrate 25 MG Oral Tab Take 1-2 tablets (25-50 mg total) by mouth daily as needed for Erectile Dysfunction. 90 tablet 1    Phentermine HCl 15 MG Oral Cap TAKE ONE CAPSULE BY MOUTH EVERY MORNING 30 capsule 1    semaglutide (OZEMPIC, 1 MG/DOSE,) 4 MG/3ML Subcutaneous Solution Pen-injector Inject 1 mg into the skin once a week. 9 mL 0    Insulin Pen Needle (PEN NEEDLES) 32G X 4 MM Does not apply Misc Inject 1 each into the skin daily. 100 each 1      Counseling given: Not Answered         Problem List:  Patient Active Problem List   Diagnosis    Erectile dysfunction    Acne    Vitamin D deficiency    Therapeutic drug monitoring    History of kidney stones    Morbid obesity with BMI of 50.0-59.9, adult (HCC)    Elevated liver enzymes    Elevated hemoglobin A1c    Prediabetes    Obstructive sleep apnea    Hypersomnia    Poor sleep       REVIEW OF SYSTEMS:   Review of Systems   All other systems reviewed and  are negative.  See HPI      EXAM:   /84 (BP Location: Left arm, Patient Position: Sitting, Cuff Size: large)   Pulse 88   Resp 16   Ht 6' 3\" (1.905 m)   Wt (!) 427 lb (193.7 kg)   SpO2 98%   BMI 53.37 kg/m²  Estimated body mass index is 53.37 kg/m² as calculated from the following:    Height as of this encounter: 6' 3\" (1.905 m).    Weight as of this encounter: 427 lb (193.7 kg).   Neck in inches:      Wt Readings from Last 6 Encounters:   09/03/24 (!) 427 lb (193.7 kg)   02/12/24 (!) 385 lb (174.6 kg)   02/05/24 (!) 379 lb (171.9 kg)   12/23/23 (!) 382 lb (173.3 kg)   10/30/23 (!) 404 lb (183.3 kg)   10/17/23 (!) 407 lb (184.6 kg)     BP Readings from Last 3 Encounters:   09/03/24 128/84   02/12/24 120/84   02/05/24 132/76     Pulse Readings from Last 3 Encounters:   09/03/24 88   02/12/24 94   02/05/24 88     SpO2 Readings from Last 3 Encounters:   09/03/24 98%   02/05/24 (!) 9%   12/23/23 98%      Patient weight not recorded    Vital signs reviewed.  Physical Exam  Vitals and nursing note reviewed.   Constitutional:       Appearance: Normal appearance. He is obese.   HENT:      Head: Normocephalic and atraumatic.      Right Ear: External ear normal.      Left Ear: External ear normal.   Pulmonary:      Effort: Pulmonary effort is normal. No respiratory distress.   Musculoskeletal:      Cervical back: Normal range of motion and neck supple.   Neurological:      General: No focal deficit present.      Mental Status: He is alert and oriented to person, place, and time.   Psychiatric:         Attention and Perception: Attention and perception normal.         Mood and Affect: Mood and affect normal.         Speech: Speech normal.         Behavior: Behavior normal. Behavior is cooperative.         Thought Content: Thought content normal.         Cognition and Memory: Cognition and memory normal.         Judgment: Judgment normal.         ASSESSMENT AND PLAN:   1. Obstructive sleep apnea- moderate  -  Controlled with pressure of 14  - Doing well on CPAP and with current mask  - Continue at current settings  Patient is using and benefiting from regular cpap use.  Patient was instructed to clean equipment on a weekly basis.  Patient was instructed to keep up to date with supplies.  Patient was informed to avoid drowsy driving, or using heavy machinery.  Patient was instructed to followup on a annual basis.     2. Hypersomnia  - ESS 10 despite adequate treatment of CHRISTINE    3. Morbid obesity with BMI of 50.0-59.9, adult (HCC)  - Previously did well on Ozempic and phentermine  - Encouraged continued weight loss  - Discussed general improvement of CHRISTINE with weight loss    4. Poor sleep  - traZODone 50 MG Oral Tab; Take 1 tablet (50 mg total) by mouth nightly as needed for Sleep. Can take up to 3 tabs at a time if needed.  Dispense: 90 tablet; Refill: 1  - Try melatonin 10mg along with CPAP first. If no improvement, ok to try trazodone. Med list reviewed.       Independent interpretation of Sleep Download as defined above.  Continue with Rx management of Sleep apnea with PAP therapy.    COMPLIANCE is required by insurance for 4 hours a night most nights of the week.    Advised if still with sleep apnea and not using CPAP has a 7 fold increase in risk of heart attack, stroke, abnormal heart rhythm  and death,  increased risk of driving accidents.     Advised to refrain from driving when sleepy.      Recommend weight loss, and maintain and optimal BMI with Exercise 30 minutes most days to target heart rate .     Advised patient to change filters,masks,hoses  and tubes and equiptment on a  regular schedule.    Filters and seals shall be changed every 1 month,  Hoses every 3 months,   CPAP mask and humidifier chamber changed every 6 month  with the durable medical equipment provider.         Meds & Refills for this Visit:  Requested Prescriptions     Signed Prescriptions Disp Refills    traZODone 50 MG Oral Tab 90 tablet 1      Sig: Take 1 tablet (50 mg total) by mouth nightly as needed for Sleep. Can take up to 3 tabs at a time if needed.       Outcome: Parent verbalizes understanding. Parent is notified to call with any questions, complications, allergies, or worsening or changing symptoms.  Parent is to call with any side effects or complications from the treatments as a result of today.     \" This note was created utilizing Dragon speech recognition software.  Please excuse any grammatical errors. Call my office if you have any questions regarding this note. \"     Ryann Payan PA-C  8/28/2024  1:24 PM

## 2024-08-29 NOTE — PROGRESS NOTES
Ag Pryor  2024 - 2024  Patient ID: [id number]  : 1985  Age: 39 years  Gender: Male  27.5 Jamilah Durant [employer]  [address] [facility] [place], [address]  Compliance Report  Usage 2024 - 2024  Usage days 66/90 days (73%)  >= 4 hours 65 days (72%)  < 4 hours 1 days (1%)  Usage hours 392 hours 35 minutes  Average usage (total days) 4 hours 22 minutes  Average usage (days used) 5 hours 57 minutes  Median usage (days used) 5 hours 43 minutes  Total used hours (value since last reset - 2024) 1,370 hours  AirSense 11 AutoSet  Serial number [id number]  Mode CPAP  Set pressure 14 cmH2O  EPR Fulltime  EPR level 2  Therapy  Leaks - L/min Median: 2.9 95th percentile: 14.3 Maximum: 19.9  Events per hour AI: 0.1 HI: 0.1 AHI: 0.2  Apnea Index Central: 0.0 Obstructive: 0.0 Unknown: 0.0  RERA Index 0.1  Cheyne-Wang respiration (average duration per night) 0 minutes (0%)

## 2024-09-03 ENCOUNTER — OFFICE VISIT (OUTPATIENT)
Facility: CLINIC | Age: 39
End: 2024-09-03
Payer: COMMERCIAL

## 2024-09-03 VITALS
HEIGHT: 75 IN | BODY MASS INDEX: 39.17 KG/M2 | SYSTOLIC BLOOD PRESSURE: 128 MMHG | RESPIRATION RATE: 16 BRPM | HEART RATE: 88 BPM | OXYGEN SATURATION: 98 % | WEIGHT: 315 LBS | DIASTOLIC BLOOD PRESSURE: 84 MMHG

## 2024-09-03 DIAGNOSIS — G47.33 OBSTRUCTIVE SLEEP APNEA: Primary | ICD-10-CM

## 2024-09-03 DIAGNOSIS — Z72.820 POOR SLEEP: ICD-10-CM

## 2024-09-03 DIAGNOSIS — G47.10 HYPERSOMNIA: ICD-10-CM

## 2024-09-03 DIAGNOSIS — E66.01 MORBID OBESITY WITH BMI OF 50.0-59.9, ADULT (HCC): ICD-10-CM

## 2024-09-03 RX ORDER — TRAZODONE HYDROCHLORIDE 50 MG/1
50 TABLET, FILM COATED ORAL NIGHTLY PRN
Qty: 90 TABLET | Refills: 1 | Status: SHIPPED | OUTPATIENT
Start: 2024-09-03

## 2024-09-03 NOTE — PATIENT INSTRUCTIONS
CPAP/BiPAP Instructions:    Please be advised:   Do not drive while sleepy   Take CPAP/BiPAP machine to any procedure that requires sedation     When should I clean my machine & supplies?   Clean mask cushions or nasal pillow, headgear, tubing, and humidifier chamber with mild soap (Sol) and water   If water chamber has hard water buildup (white crust), soak in warm water & vinegar mix 50/50.   Rinse and hang dry     DAILY   Wipe mask cushions or nasal pillow   Empty & rinse water chamber- refill with distilled water     WEEKLY   Clean mask cushions or nasal pillow, headgear, tubing, and humidifier chamber with mild soap (Sol) and water   If water chamber has hard water buildup (white crust), soak in warm water & vinegar mix 50/50.   Rinse and hang dry     When should supplies be replaced?   Contact your home care company for replacement supplies, or if your machine is malfunctioning   *Below is a general guideline of what we recommend. Replacement of supplies differs depending on your insurance company*   MONTHLY: Replace filter and mask cushion   6 MONTHS: Replace headgear and tubing     Travel Tips   Keep CPAP/BiPAP in original bag when traveling, and place luggage tag on bag   Most airlines consider CPAP/BiPAP to be a medical device, therefore it is a free carry-on item   If unable to get distilled water, bottled water is safe while traveling. DO NOT use tap water   When traveling outside the [place] only a power adapter is necessary (CPAP can operate without a converter), bring an extension cord   Consider purchasing or renting a travel CPAP (not covered by insurance)     Dry Mouth/Nose   Turn up the humidity on your machine (select \"Options\" from the home screen)   Place a cool mist humidifier at your bedside   Over-the-counter remedies: Biotene, XyliMelts, NasoGel     Air Leak   Try adjusting your mask/headgear while laying in sleeping position vs. sitting up   Wash and dry your face prior to putting your  mask on   If applicable: shave facial hair at night (or before wearing CPAP)   Purchase \"RemZzzs\" (through home care co., [employer], or [email or web address])   100% cotton knit barrier that goes between your mask cushion and your skin   Replace your mask cushion (at least once per month) and/or headgear (every 3-6 months)     Nasal Congestion   CPAP therapy can cause nasal passages to dry out, & mucous membranes try to protect the nasal passages by producing excess mucous, so congestion results.   Over-the counter remedies: Flonase, Nasacort, Sinus Rinses (Neti-Pot), DO NOT USE Afrin   Try a mask that goes over the nose and mouth     Skin Irritation   Clean supplies regularly (Citrus II Mask Wipes, Control III disinfectant solution)   Try over the counter creams such as hydrocortisone 1% (apply in the morning after showering)   Your headgear may be too tight, replace supplies so you don't need to adjust so tightly   Try RemZzzs (100% cotton knit barrier that goes between your mask cushion and your skin)     Gas/Bloating   Try a different sleeping position to keep air out of the stomach. Lay on the left side or rotate to the right side. Incline with pillows or lay flat.   Over-the-counter remedies: Simethicone     ---------------------------------    How to avoid insomnia  Wake up at the same time each day.Maintaining a regular sleep schedule is important to good sleep.     Eliminate alcohol and stimulants like nicotine and caffeine.The effects of caffeine can last for several hours, perhaps up to 24 hours, so the chances of it affecting sleep are significant. Caffeine may not only cause difficulty initiating sleep, but may also cause frequent awakenings. Alcohol may have a sedative effect for the first few hours following consumption, but it can then lead to frequent arousals and a non-restful night's sleep. Medications that act as stimulants, such as decongestants  can also disrupt your sleep and should be avoided.  Use of  technology such as computers, media and smart phones prior to bedtime should be avoided.  Do not look at the clock. Clock watching has been associated with increased anxiety during sleep and worsening of insomnia.   The bright lights from modern digital clocks have been associated with poor sleep.  Clocks should be turned away from you and if the light is too bright, they should be covered.    Eliminate naps.While napping seems like a proper way to catch up on missed sleep, it is not always so. It is important to establish and maintain a regular sleep pattern and train oneself to associate sleep with cues like darkness and a consistent bedtime. Napping can affect the quality of nighttime sleep. If you must nap, do not nap past 1pm and no longer than 45min-1 hour.  Exercise regularly.Regular exercise can improve sleep quality and duration. However, exercising immediately before bedtime can have a stimulant effect on the body and should be avoided. Try to finish exercising at least three hours before you plan to retire for the night.  Limit activities in bed.The bed is for sleeping and having sex and that's it. Do not use the computer or smart phone, watch TV, catch up on work or listen to music while in bed. All these activities can increase alertness and make it difficult to fall asleep.  Do not eat or drink right before going to bed.Eating a late dinner or snacking before going to bed can activate the digestive system and keep you up.  Drinking a lot of fluids prior to bed can overwhelm the bladder, requiring frequent visits to the bathroom that disturb your sleep.  Make your sleeping environment comfortable.Temperature, lighting, and noise should be controlled to make the bedroom conducive to falling (and staying) asleep. Ideally your bedroom temperature should be 66-68 degrees.  Rooms that are warm will make it more difficult to fall asleep.  Do not allow your pet to sleep on your bed or in the bedroom.  If your  bed partner is disruptive due to snoring or constant movement, consider sleeping alone in a separate bedroom.    Get all your worrying over with before you go to bed.If you find you lay in bed thinking about tomorrow, consider setting aside a period of time -- perhaps after dinner -- to review the day and to make plans for the next day. The goal is to avoid doing these things while trying to fall asleep. It is also useful to make a list of, say, work-related tasks for the next day before leaving work. That, at least, eliminates one set of concerns.  Reduce stress.There are a number of relaxation therapies and stress reduction methods you may want to try to relax the mind and the body before going to bed. Examples include progressive muscle relaxation (perhaps with audio tapes), deep breathing techniques, imagery, and meditation.

## 2024-10-30 DIAGNOSIS — Z72.820 POOR SLEEP: ICD-10-CM

## 2024-10-31 RX ORDER — TRAZODONE HYDROCHLORIDE 50 MG/1
TABLET, FILM COATED ORAL
Qty: 90 TABLET | Refills: 1 | Status: SHIPPED | OUTPATIENT
Start: 2024-10-31

## 2024-11-04 NOTE — PROGRESS NOTES
Anil IBRAHIM- Patient is inquiring about lab work to be placed prior to his scheduled 11/11/24 MWV. I have prepped labs below. He does have active lab orders in from other providers however due not look due just yet. Please send encounter back once signed and add dx.    HISTORY OF PRESENT ILLNESS  Chief Complaint   Patient presents with    Weight Check     -19     Ag Pryor is a 38 year old male here for follow up with medical weight loss program for the treatment of overweight, obesity, or morbid obesity.     Down 19 lbs  Compliant on phentermine 15mg, ozempic 1mg weekly    Tolerating well, helping with decreasing appetite and no side effects     Success: losing #75 lbs   Challenging: exercise   Feeling like he is not doing his part with exercise and nutrition, feels like he has hit a plateau    Feels less tired with CPAP machine (for the past 2 months)   Still not doing soda, candy bars   Did [place] and ate out more than normal   Exercise/Activity: walking 1-2 days per week, not doing anything scheduled   Nutrition: eating regular meals, +protein, minimal veggies. not tracking reports  Meals out per week on average: 1  Stress is manageable   Sleep: 6 hours/night, waking up feeling rested- using new CPAP machine     Denies chest pain, shortness of breath, dizziness, blurred vision, headache, paresthesia, nausea/vomiting.     Breakfast Lunch Dinner Snacks Fluids   Reviewed              Wt Readings from Last 6 Encounters:   02/12/24 (!) 385 lb (174.6 kg)   02/05/24 (!) 379 lb (171.9 kg)   12/23/23 (!) 382 lb (173.3 kg)   10/30/23 (!) 404 lb (183.3 kg)   10/17/23 (!) 407 lb (184.6 kg)   08/11/23 (!) 445 lb (201.9 kg)          REVIEW OF SYSTEMS  GENERAL: feels well otherwise, denied any fevers chills or night sweats   LUNGS: denies shortness of breath  CARDIOVASCULAR: denies chest pain  GI: denies abdominal pain  MUSCULOSKELETAL: denies back pain, joint pains   PSYCH: denies change in behavior or mood, denies feeling sad or depressed    EXAM  /84   Pulse 94   Resp 16   Ht 6' 2\" (1.88 m)   Wt (!) 385 lb (174.6 kg)   BMI 49.43 kg/m²       GENERAL: well developed, well nourished, in no apparent distress, A/O x3  SKIN: no rashes, no  suspicious lesions  HEENT: atraumatic, normocephalic, OP-clear, PERRLA  NECK: supple, no adenopathy  LUNGS: CTA in all fields, breathing non labored  CARDIO: RRR without murmur  GI: +BS, NT/ND, no masses or HSM  EXTREMITIES: no cyanosis, no clubbing, no edema    Lab Results   Component Value Date     (H) 07/15/2023    BUN 8 07/15/2023    BUNCREA NOT APPLICABLE 07/15/2023    CREATSERUM 0.80 07/15/2023    ANIONGAP 5 01/08/2021    GFRNAA 116 05/16/2022    GFRAA 135 05/16/2022    CA 9.2 07/15/2023    OSMOCALC 285 01/08/2021    ALKPHO 75 07/15/2023    AST 64 (H) 07/15/2023    ALT 80 (H) 07/15/2023    BILT 0.9 07/15/2023    TP 6.6 07/15/2023    ALB 4.2 07/15/2023    GLOBULIN 2.4 07/15/2023    AGRATIO 1.8 07/15/2023     07/15/2023    K 4.2 07/15/2023     07/15/2023    CO2 28 07/15/2023     Lab Results   Component Value Date     10/27/2018    A1C 6.2 (H) 07/15/2023     Lab Results   Component Value Date    CHOLEST 194 07/15/2023    TRIG 97 07/15/2023    HDL 49 07/15/2023     (H) 07/15/2023    VLDL 19 10/27/2018    TCHDLRATIO 4.0 07/15/2023    NONHDLC 145 (H) 07/15/2023     Lab Results   Component Value Date    B12 521 12/04/2018    VITB12 479 07/15/2023     Lab Results   Component Value Date    VITD 21 (L) 07/15/2023       Current Outpatient Medications on File Prior to Visit   Medication Sig Dispense Refill    Phentermine HCl 15 MG Oral Cap Take 1 capsule (15 mg total) by mouth every morning. 30 capsule 2    semaglutide (OZEMPIC, 1 MG/DOSE,) 4 MG/3ML Subcutaneous Solution Pen-injector Inject 1 mg into the skin once a week. 3 mL 2    Insulin Pen Needle (PEN NEEDLES) 32G X 4 MM Does not apply Misc Inject 1 each into the skin daily. 100 each 1    Sildenafil Citrate 25 MG Oral Tab Take 1-2 tablets (25-50 mg total) by mouth daily as needed for Erectile Dysfunction. 90 tablet 1     No current facility-administered medications on file prior to visit.       ASSESSMENT/PLAN    ICD-10-CM    1.  Therapeutic drug monitoring  Z51.81       2. Morbid obesity with BMI of 50.0-59.9, adult (HCC)  E66.01     Z68.43       3. Prediabetes  R73.03       4. Vitamin D deficiency  E55.9       5. Snoring  R06.83           PLAN   Initial Weight Data and Goal Weight Loss:  Initial consult: #376 lbs on 12/2018  Weight Calculations  Initial Weight: 376 lbs  Initial Weight Date: 12/01/18  Today's Weight: 385 lbs  5% Goal: 18.8  10% Goal: 37.6  Total Weight Loss: -9 lbs  Total weight loss: Down 19 lbs total, Net loss +9 lbs  Continue with medications: ozempic 1mg weekly- used for insulin resistance since wegovy is not covered by insurance   Continue with medications: phentermine 15mg (wants to stay at this dose)  --advised of side effects and adverse effects of this medication  Contradictions: victoza, avoid topamax hx of kidney      Reviewed labs  Continue with vitamin d high dose  prediabetic, reviewed last A1c 6.2% on 7/2023- will continue with ozempic   CHRISTINE- continue with night CPAP machine   Try to get in physical activity and exercise   Nutrition: Low carb diet, recommended to eat breakfast daily/ regular protein intake  Follow up with dietitian and psychologist as recommended.  Discussed the role of sleep and stress in weight management.  Counseled on comprehensive weight loss plan including attention to nutrition, exercise and behavior/stress management for success. See patient instruction below for more details.  Discussed strategies to overcome barriers to successful weight loss and weight maintenance  FITTE: ACSM recommendations (150-300 minutes/ week in active weight loss)   Weight Loss Consent to treat reviewed and signed.    Total time spent on chart review, pre-charting, obtaining history, counseling, and educating, reviewing labs was 27 minutes.       NOTE TO PATIENT: The 21st Century Cures Act makes clinical notes like these available to patients in the interest of transparency. Clinical notes are medical  documents used by physicians and care providers to communicate with each other. These documents include medical language and terminology, abbreviations, and treatment information that may sound technical and at times possibly unfamiliar. In addition, at times, the verbiage may appear blunt or direct. These documents are one tool providers use to communicate relevant information and clinical opinions of the care providers in a way that allows common understanding of the clinical context.     There are no Patient Instructions on file for this visit.    No follow-ups on file.    Patient verbalizes understanding.    Ashley Abbott, APRN

## 2024-11-14 ENCOUNTER — HOSPITAL ENCOUNTER (OUTPATIENT)
Age: 39
Discharge: HOME | End: 2024-11-14
Payer: COMMERCIAL

## 2024-11-14 ENCOUNTER — APPOINTMENT (OUTPATIENT)
Dept: GENERAL RADIOLOGY | Age: 39
End: 2024-11-14
Attending: PHYSICIAN ASSISTANT
Payer: COMMERCIAL

## 2024-11-14 VITALS
TEMPERATURE: 98 F | WEIGHT: 315 LBS | DIASTOLIC BLOOD PRESSURE: 94 MMHG | HEIGHT: 75 IN | OXYGEN SATURATION: 95 % | BODY MASS INDEX: 39.17 KG/M2 | HEART RATE: 92 BPM | RESPIRATION RATE: 24 BRPM | SYSTOLIC BLOOD PRESSURE: 159 MMHG

## 2024-11-14 DIAGNOSIS — R09.82 PND (POST-NASAL DRIP): ICD-10-CM

## 2024-11-14 DIAGNOSIS — R05.8 PRODUCTIVE COUGH: ICD-10-CM

## 2024-11-14 DIAGNOSIS — R03.0 ELEVATED BLOOD PRESSURE READING WITHOUT DIAGNOSIS OF HYPERTENSION: Primary | ICD-10-CM

## 2024-11-14 LAB — SARS-COV-2 RNA RESP QL NAA+PROBE: NOT DETECTED

## 2024-11-14 PROCEDURE — 99214 OFFICE O/P EST MOD 30 MIN: CPT

## 2024-11-14 PROCEDURE — 71046 X-RAY EXAM CHEST 2 VIEWS: CPT | Performed by: PHYSICIAN ASSISTANT

## 2024-11-14 PROCEDURE — 94640 AIRWAY INHALATION TREATMENT: CPT

## 2024-11-14 RX ORDER — AZITHROMYCIN 250 MG/1
TABLET, FILM COATED ORAL
Qty: 6 TABLET | Refills: 0 | Status: SHIPPED | OUTPATIENT
Start: 2024-11-14 | End: 2024-11-19

## 2024-11-14 RX ORDER — ALBUTEROL SULFATE 90 UG/1
2 INHALANT RESPIRATORY (INHALATION) ONCE
Status: COMPLETED | OUTPATIENT
Start: 2024-11-14 | End: 2024-11-14

## 2024-11-14 RX ORDER — PREDNISONE 20 MG/1
40 TABLET ORAL DAILY
Qty: 6 TABLET | Refills: 0 | Status: SHIPPED | OUTPATIENT
Start: 2024-11-14 | End: 2024-11-17

## 2024-11-14 RX ORDER — DEXAMETHASONE 4 MG/1
4 TABLET ORAL ONCE
Status: COMPLETED | OUTPATIENT
Start: 2024-11-14 | End: 2024-11-14

## 2024-11-14 NOTE — DISCHARGE INSTRUCTIONS
Please return to the ER/clinic if symptoms worsen. Follow-up with your PCP in 24-48 hours as needed.    The decadron will work in your system the next several days.  You may start the additional prednisone on day 2 or 3 if symptoms persist.  Use your inhaler every 4-6 hours as needed.  Recommend taking an over the counter antihistamine daily: IE zyrtec/claritin.  Sleep more upright. Use chloraseptic spray to help stop the cough trigger reflex.  Push fluids and gargle with warm saline rinses.   Take the antibiotic as prescribed.  If symptoms persist or worsen i.e. increasing fevers or shortness of breath head to the emergency room.  Otherwise follow the Dash dietary plan and log your blood pressures at different intervals and follow-up with your PCP.

## 2024-11-14 NOTE — ED PROVIDER NOTES
Patient Seen in: [facility]      History     Chief Complaint   Patient presents with    Cough     Stated Complaint: sore throat/productive cough    Subjective:   HPI    40 yo male here with complaint of sore throat wheezing and a productive cough with purulent sputum that started earlier in the week.  Patient denies chest pain, shortness of breath, abdominal pain, nausea, vomiting or diarrhea.  Patient does have body aches and chills.  Patient denies any formal diagnosis of hypertension however states that it runs on the upper side of normal.  Patient denies headache or blurry vision.  Patient denies any further complaints.  Afebrile      Objective:     Past Medical History:    Abnormal EKG    Depression    Kidney stone    Lack of energy    Mood swings    Obesity    Sleep apnea            The patient's medication list, past medical history and social history elements  as listed in today's nurse's notes are reviewed and agree.   The patient's family history is reviewed and is noncontributory to the presenting problem, except as indicated as above.     Past Surgical History:   Procedure Laterality Date    Cholecystectomy  01/2019    Removal gallbladder                  Social History     Socioeconomic History    Marital status:    Tobacco Use    Smoking status: Never    Smokeless tobacco: Never   Vaping Use    Vaping status: Never Used   Substance and Sexual Activity    Alcohol use: Not Currently     Alcohol/week: 1.0 standard drink of alcohol     Types: 1 Standard drinks or equivalent per week     Comment: occ    Drug use: Never   Other Topics Concern    Caffeine Concern No    Exercise Yes    Seat Belt Yes    Special Diet No    Stress Concern No    Weight Concern Yes              Review of Systems    Positive for stated complaint: sore throat  Other systems are as noted in HPI.  Constitutional and vital signs reviewed.      All other systems reviewed and negative except as noted  above.    Physical Exam     ED Triage Vitals [11/14/24 0855]   BP (!) 159/94   Pulse 92   Resp 24   Temp 97.8 °F (36.6 °C)   Temp src Temporal   SpO2 95 %   O2 Device None (Room air)       Current Vitals:   Vital Signs  BP: (!) 159/94  Pulse: 92  Resp: 24  Temp: 97.8 °F (36.6 °C)  Temp src: Temporal    Oxygen Therapy  SpO2: 95 %  O2 Device: None (Room air)        Physical Exam  Vitals and nursing note reviewed.   Constitutional:       Appearance: Normal appearance. He is well-developed.   HENT:      Head: Normocephalic.      Jaw: There is normal jaw occlusion.      Right Ear: External ear normal. Tympanic membrane is bulging.      Left Ear: External ear normal. Tympanic membrane is bulging.      Nose: Mucosal edema, congestion and rhinorrhea present. Rhinorrhea is clear.      Mouth/Throat:      Lips: Pink.      Mouth: Mucous membranes are moist.      Pharynx: Oropharynx is clear.      Comments: Uvula midline: No trismus or drooling: No peritonsillar abscess noted moderate cobblestoning in the posterior pharynx  Eyes:      Conjunctiva/sclera: Conjunctivae normal.      Pupils: Pupils are equal, round, and reactive to light.   Cardiovascular:      Rate and Rhythm: Normal rate and regular rhythm.      Heart sounds: Normal heart sounds.   Pulmonary:      Effort: Pulmonary effort is normal.      Breath sounds: Decreased breath sounds, wheezing and rhonchi present.   Musculoskeletal:      Cervical back: Normal range of motion and neck supple.   Skin:     General: Skin is warm.      Capillary Refill: Capillary refill takes less than 2 seconds.   Neurological:      General: No focal deficit present.      Mental Status: He is alert and oriented to person, place, and time.   Psychiatric:         Mood and Affect: Mood normal.         Behavior: Behavior normal.         Thought Content: Thought content normal.         Judgment: Judgment normal.           ED Course     Labs Reviewed   RAPID SARS-COV-2 BY PCR - Normal   I  personally reviewed the xray images and and saw these findings: no consolidation  XR CHEST PA + LAT CHEST (CPT=71046)    Result Date: 11/14/2024  PROCEDURE:  XR CHEST PA + LAT CHEST (CPT=71046)  INDICATIONS:  sore throat, cough  COMPARISON:  [facility] , XR, XR CHEST AP PORTABLE  (CPT=71045), 3/28/2020, 4:42 PM.  TECHNIQUE:  PA and lateral chest radiographs were obtained.  PATIENT STATED HISTORY: (As transcribed by Technologist)  Patient states he has had a cough, sore throat and congestion for two days.    FINDINGS:  Peribronchial thickening with mild hyperinflation could be related to bronchitis and/or asthma. Clinical correlation recommended. Normal heart size and pulmonary vascularity. No pleural effusion or pneumothorax. No lobar consolidation.            CONCLUSION:  Peribronchial thickening with mild hyperinflation could be related to bronchitis and/or asthma. Clinical correlation recommended.   LOCATION:  [facility], [place]   Dictated by (CST): Derrek Campo MD on 11/14/2024 at 9:32 AM     Finalized by (CST): Derrek Campo MD on 11/14/2024 at 9:32 AM                   MDM   Clinical Impression: elevated blood pressure reading without diagnosis of HTN/bronchitis/PND  Course of Treatment:   The decadron will work in your system the next several days.  You may start the additional prednisone on day 2 or 3 if symptoms persist.  Use your inhaler every 4-6 hours as needed.  Recommend taking an over the counter antihistamine daily: IE zyrtec/claritin.  Sleep more upright. Use chloraseptic spray to help stop the cough trigger reflex.  Push fluids and gargle with warm saline rinses.   Take the antibiotic as prescribed.  If symptoms persist or worsen i.e. increasing fevers or shortness of breath head to the emergency room.  Otherwise follow the Dash dietary plan and log your blood pressures at different intervals and follow-up with your PCP.    The patient is encouraged to return if any concerning symptoms arise. Additional  verbal discharge instructions are given and discussed. Discharge medications are discussed. The patient is in good condition throughout the visit today and remains so upon discharge. I discuss the plan of care with the patient, who expresses understanding. All questions and concerns are addressed to the patient's satisfaction prior to discharge today.  Previous conversations with PCP and charts were reviewed.                Disposition and Plan     Clinical Impression:  1. Elevated blood pressure reading without diagnosis of hypertension    2. Productive cough    3. PND (post-nasal drip)         Disposition:  Discharge  11/14/2024  9:42 am    Follow-up:  Pati Wallace MD  [address] [facility] [address]  [phone]                Medications Prescribed:  Current Discharge Medication List        START taking these medications    Details   predniSONE 20 MG Oral Tab Take 2 tablets (40 mg total) by mouth daily for 3 days. Start on day 2-3 if symptoms persist  Qty: 6 tablet, Refills: 0      azithromycin (ZITHROMAX Z-VALARIE) 250 MG Oral Tab 500 mg once followed by 250 mg daily x 4 days  Qty: 6 tablet, Refills: 0                 Supplementary Documentation:

## 2024-11-14 NOTE — ED INITIAL ASSESSMENT (HPI)
Presents for cough that started Tuesday.  Then developed headache, ear pain, and chest congestion. Body aches, and sore throat. Denies fevers or chills.

## 2024-11-25 ENCOUNTER — TELEPHONE (OUTPATIENT)
Dept: INTERNAL MEDICINE CLINIC | Facility: CLINIC | Age: 39
End: 2024-11-25

## 2024-11-25 ENCOUNTER — OFFICE VISIT (OUTPATIENT)
Dept: INTERNAL MEDICINE CLINIC | Facility: CLINIC | Age: 39
End: 2024-11-25
Payer: COMMERCIAL

## 2024-11-25 VITALS
HEART RATE: 83 BPM | HEIGHT: 74.75 IN | BODY MASS INDEX: 39.58 KG/M2 | OXYGEN SATURATION: 98 % | WEIGHT: 315 LBS | DIASTOLIC BLOOD PRESSURE: 96 MMHG | TEMPERATURE: 99 F | SYSTOLIC BLOOD PRESSURE: 148 MMHG

## 2024-11-25 DIAGNOSIS — R03.0 ELEVATED BLOOD PRESSURE READING: ICD-10-CM

## 2024-11-25 DIAGNOSIS — E66.01 MORBID OBESITY WITH BMI OF 50.0-59.9, ADULT (HCC): ICD-10-CM

## 2024-11-25 DIAGNOSIS — J22 ACUTE RESPIRATORY INFECTION: Primary | ICD-10-CM

## 2024-11-25 DIAGNOSIS — Z00.00 PREVENTATIVE HEALTH CARE: ICD-10-CM

## 2024-11-25 DIAGNOSIS — E55.9 VITAMIN D DEFICIENCY: ICD-10-CM

## 2024-11-25 DIAGNOSIS — R73.03 PREDIABETES: ICD-10-CM

## 2024-11-25 PROCEDURE — 99214 OFFICE O/P EST MOD 30 MIN: CPT | Performed by: INTERNAL MEDICINE

## 2024-11-25 NOTE — PROGRESS NOTES
Ag Pryor is a 39 year old male.   HPI:   Patient presents for follow up on acute issues.  Seen at our immediate care 10 days ago with cough, sore throat, wheezing.  Chest x-ray with bronchitis versus asthma exacerbation.  Received dexamethasone, albuterol inhaler in immediate care.  Discharged on oral prednisone, azithromycin.  Did not end up needing/taking the prednisone.  Symptoms improved over that weekend, patient was able to return to work that following Monday.  Overall doing better, still intermittent dry cough but no shortness of breath or wheezing.    Blood pressure elevated at immediate care.  Elevated/hypertensive range today in office as well.     Other chronic issues:  Obesity - Body mass index is 54.86 kg/m².  Has seen our weight loss clinic previously.  Prediabetes - lifestyle controlled.  Vitamin D deficiency - not currently on supplementation.    Past medical, family, surgical and social history were reviewed as listed in the chart, and are unchanged from previous visit.  REVIEW OF SYSTEMS:   GENERAL/ const: no fevers/chills, no unintentional weight loss  EYES:no vision problems  HEENT: denies sinus pain or sinus tenderness  LUNGS: shortness of breath/wheezing, resolved; dry cough  CARDIOVASCULAR: denies chest pain  GI: denies nausea/emesis/ abdominal pain diarrhea constipation  : denies dysuria   NEURO: denies headaches  ALLERGY: Allergies[1]  PAST HISTORY:     Current Outpatient Medications:     TRAZODONE 50 MG Oral Tab, TAKE 1 TABLET BY MOUTH NIGHTLY AS NEEDED FOR SLEEP, CAN TAKE UP TO 3 TABS AT A TIME IF NEEDED, Disp: 90 tablet, Rfl: 1    Sildenafil Citrate 25 MG Oral Tab, Take 1-2 tablets (25-50 mg total) by mouth daily as needed for Erectile Dysfunction., Disp: 90 tablet, Rfl: 1  Medical:  has a past medical history of Abnormal EKG (12/01/2018), Depression (early 2019), Kidney stone (05/22/2018), Lack of energy (12/01/2018), Mood swings (12/01/2018), Obesity (whole life), and Sleep apnea  (10/7/23).  Surgical:  has a past surgical history that includes removal gallbladder and cholecystectomy (01/2019).  Family: family history includes Diabetes in his mother; Heart Disease in his maternal grandfather and paternal grandfather.  Social:  reports that he has never smoked. He has never used smokeless tobacco. He reports that he does not currently use alcohol. He reports that he does not use drugs.  Wt Readings from Last 6 Encounters:   11/25/24 (!) 436 lb (197.8 kg)   11/14/24 (!) 425 lb (192.8 kg)   09/03/24 (!) 427 lb (193.7 kg)   02/12/24 (!) 385 lb (174.6 kg)   02/05/24 (!) 379 lb (171.9 kg)   12/23/23 (!) 382 lb (173.3 kg)     EXAM:   BP (!) 148/96 (BP Location: Right arm, Patient Position: Sitting, Cuff Size: large)   Pulse 83   Temp 98.6 °F (37 °C) (Temporal)   Ht 6' 2.75\" (1.899 m)   Wt (!) 436 lb (197.8 kg)   SpO2 98%   BMI 54.86 kg/m²   GENERAL: Alert and oriented, well developed, well nourished,in no apparent distress  HEENT: atraumatic, PERRLA, EOMI, normal lid and conjunctiva  LUNGS: clear to auscultation bilaterally, no wheezing/rubs  CARDIO: RRR without murmurs.  No clubbing, cyanosis or edema.  GI: soft non tender nondistended no hepatosplenomegaly, bowel sounds throughout  PSYCH: pleasant, appropriate mood and affect  ASSESSMENT AND PLAN:   1. Acute respiratory infection  Patient with bronchitis/respiratory symptoms 10 days ago.  Seen at our immediate care, chest x-ray with bronchitis versus asthma exacerbation findings.  He received dexamethasone and albuterol inhaler doses in immediate care, discharged home on azithromycin.  Symptoms improved, patient returned to work the next Monday.  Dry cough intermittently now but other symptoms improved.  Will monitor for now.  [facility] paperwork completed for time missed 11/13/24 - 11/15/24, form faxed in and original given back to patient.    2. Elevated blood pressure reading  Hypertensive range readings at immediate care and in office today.   Will have patient keep home blood pressure log, bring to next visit.    3. Morbid obesity with BMI of 50.0-59.9, adult (HCC)  Body mass index is 54.86 kg/m².  Focus on lifestyle modification.  Has been seen at our weight loss clinic (Ashley Abbott) earlier.    4. Prediabetes  Lifestyle controlled.  Labs ordered as below.   - Comp Metabolic Panel (14); Future  - Hemoglobin A1C; Future    5. Vitamin D deficiency  Not on supplementation.  Will check vitamin D levels.  - VITAMIN D, 25-HYDROXY [76916][Q]; Future    6. Preventative health care  Screening labs ordered, to be done before physical.  - CBC With Differential With Platelet; Future  - Comp Metabolic Panel (14); Future  - Hemoglobin A1C; Future  - Lipid Panel; Future    Patient Care Team:  Pati Wallace MD as PCP - General (Internal Medicine)  Kush Tineo RD (Dietitian)  Ashley Abbott APRN (Nurse Practitioner Family)  The patient indicates understanding of these issues and agrees to the plan.  The patient is asked to return to clinic in 1 month for follow up on chronic issues/CPX, or earlier if acute issues arise.    Pati Wallace MD           [1]   Allergies  Allergen Reactions    Cephalexin HIVES

## 2024-12-29 LAB
ABSOLUTE BASOPHILS: 297 CELLS/UL (ref 0–200)
ABSOLUTE EOSINOPHILS: 297 CELLS/UL (ref 15–500)
ABSOLUTE MONOCYTES: 990 CELLS/UL (ref 200–950)
ABSOLUTE NEUTROPHILS: 6039 CELLS/UL (ref 1500–7800)
ALBUMIN/GLOBULIN RATIO: 1.7 (CALC) (ref 1–2.5)
ALBUMIN: 4.5 G/DL (ref 3.6–5.1)
ALKALINE PHOSPHATASE: 79 U/L (ref 36–130)
ALT: 59 U/L (ref 9–46)
AST: 41 U/L (ref 10–40)
BASOPHILS: 3 %
BILIRUBIN, TOTAL: 0.8 MG/DL (ref 0.2–1.2)
BUN: 10 MG/DL (ref 7–25)
CALCIUM: 9.5 MG/DL (ref 8.6–10.3)
CARBON DIOXIDE: 24 MMOL/L (ref 20–32)
CHLORIDE: 104 MMOL/L (ref 98–110)
CHOL/HDLC RATIO: 4.1 (CALC)
CHOLESTEROL, TOTAL: 219 MG/DL
CREATININE: 0.65 MG/DL (ref 0.6–1.26)
EGFR: 124 ML/MIN/1.73M2
EOSINOPHILS: 3 %
GLOBULIN: 2.6 G/DL (CALC) (ref 1.9–3.7)
GLUCOSE: 104 MG/DL (ref 65–99)
HDL CHOLESTEROL: 53 MG/DL
HEMATOCRIT: 45.2 % (ref 38.5–50)
HEMOGLOBIN A1C: 5.7 % OF TOTAL HGB
HEMOGLOBIN: 15.1 G/DL (ref 13.2–17.1)
LDL-CHOLESTEROL: 139 MG/DL (CALC)
LYMPHOCYTE ABSOLUTE: 2277 CELLS/UL (ref 850–3900)
LYMPHOCYTES: 23 %
MCH: 28.5 PG (ref 27–33)
MCHC: 33.4 G/DL (ref 32–36)
MCV: 85.4 FL (ref 80–100)
MONOCYTES: 10 %
MPV: 11.3 FL (ref 7.5–12.5)
NEUTROPHILS: 61 %
NON-HDL CHOLESTEROL: 166 MG/DL (CALC)
PLATELET COUNT: 265 THOUSAND/UL (ref 140–400)
POTASSIUM: 4.3 MMOL/L (ref 3.5–5.3)
PROTEIN, TOTAL: 7.1 G/DL (ref 6.1–8.1)
RDW: 13.9 % (ref 11–15)
RED BLOOD CELL COUNT: 5.29 MILLION/UL (ref 4.2–5.8)
SODIUM: 138 MMOL/L (ref 135–146)
TRIGLYCERIDES: 144 MG/DL
VITAMIN D, 25-OH, TOTAL: 22 NG/ML (ref 30–100)
WHITE BLOOD CELL COUNT: 9.9 THOUSAND/UL (ref 3.8–10.8)

## 2025-01-06 ENCOUNTER — OFFICE VISIT (OUTPATIENT)
Dept: INTERNAL MEDICINE CLINIC | Facility: CLINIC | Age: 40
End: 2025-01-06
Payer: COMMERCIAL

## 2025-01-06 VITALS
OXYGEN SATURATION: 99 % | SYSTOLIC BLOOD PRESSURE: 138 MMHG | HEIGHT: 74.5 IN | WEIGHT: 315 LBS | BODY MASS INDEX: 40 KG/M2 | RESPIRATION RATE: 16 BRPM | DIASTOLIC BLOOD PRESSURE: 88 MMHG | TEMPERATURE: 98 F | HEART RATE: 82 BPM

## 2025-01-06 DIAGNOSIS — Z00.00 ENCOUNTER FOR PREVENTATIVE ADULT HEALTH CARE EXAMINATION: Primary | ICD-10-CM

## 2025-01-06 DIAGNOSIS — R74.8 ELEVATED LIVER ENZYMES: ICD-10-CM

## 2025-01-06 DIAGNOSIS — Z23 NEED FOR DIPHTHERIA-TETANUS-PERTUSSIS (TDAP) VACCINE: ICD-10-CM

## 2025-01-06 DIAGNOSIS — Z23 NEED FOR IMMUNIZATION AGAINST INFLUENZA: ICD-10-CM

## 2025-01-06 DIAGNOSIS — R73.03 PREDIABETES: ICD-10-CM

## 2025-01-06 DIAGNOSIS — E55.9 VITAMIN D DEFICIENCY: ICD-10-CM

## 2025-01-06 PROCEDURE — 99395 PREV VISIT EST AGE 18-39: CPT | Performed by: INTERNAL MEDICINE

## 2025-01-06 PROCEDURE — 90715 TDAP VACCINE 7 YRS/> IM: CPT | Performed by: INTERNAL MEDICINE

## 2025-01-06 PROCEDURE — 90471 IMMUNIZATION ADMIN: CPT | Performed by: INTERNAL MEDICINE

## 2025-01-06 NOTE — PROGRESS NOTES
Ag Pryor is a 39 year old male.   HPI:   Patient presents for CPX/wellness examination.    Diet: Not the best.  Exercise: Not a lot of exercise  Vision: No corrective lenses.  Dental: Had a cleaning in the past year, getting cleanings twice a year.     Acute issues:  None at this time.     Chronic issues:  Prediabetes - lifestyle controlled.  Elevated liver enzymes - no jaundice or icterus.  Vitamin D deficiency - not on supplementation.    Past medical, family, surgical and social history were reviewed as listed in the chart, and are unchanged from previous visit on 11/25/2024  REVIEW OF SYSTEMS:   GENERAL/ const: no fevers/chills, no unintentional weight loss  SKIN: denies any unusual skin lesions  EYES:no vision problems  HEENT: denies sinus pain or sinus tenderness  LUNGS: denies shortness of breath   CARDIOVASCULAR: denies chest pain  GI: denies nausea/emesis/ abdominal pain diarrhea constipation  : denies dysuria   MUSCULOSKELETAL: no acute arthralgias  NEURO: denies headaches  PSYCHIATRIC: denies issues  ENDOCRINE: no hot/cold intolerance  ALLERGY: Allergies[1]  PAST HISTORY:     Current Outpatient Medications:     TRAZODONE 50 MG Oral Tab, TAKE 1 TABLET BY MOUTH NIGHTLY AS NEEDED FOR SLEEP, CAN TAKE UP TO 3 TABS AT A TIME IF NEEDED, Disp: 90 tablet, Rfl: 1    Sildenafil Citrate 25 MG Oral Tab, Take 1-2 tablets (25-50 mg total) by mouth daily as needed for Erectile Dysfunction., Disp: 90 tablet, Rfl: 1  Medical:  has a past medical history of Abnormal EKG (12/01/2018), Depression (early 2019), Kidney stone (05/22/2018), Lack of energy (12/01/2018), Mood swings (12/01/2018), Obesity (whole life), and Sleep apnea (10/7/23).  Surgical:  has a past surgical history that includes removal gallbladder and cholecystectomy (01/2019).  Family: family history includes Diabetes in his mother; Heart Disease in his maternal grandfather and paternal grandfather.  Social:  reports that he has never smoked. He has never  used smokeless tobacco. He reports current alcohol use. He reports that he does not use drugs.  Wt Readings from Last 6 Encounters:   01/06/25 (!) 440 lb 12.8 oz (199.9 kg)   11/25/24 (!) 436 lb (197.8 kg)   11/14/24 (!) 425 lb (192.8 kg)   09/03/24 (!) 427 lb (193.7 kg)   02/12/24 (!) 385 lb (174.6 kg)   02/05/24 (!) 379 lb (171.9 kg)     EXAM:   /88 (BP Location: Right arm, Patient Position: Sitting, Cuff Size: thigh)   Pulse 82   Temp 98.2 °F (36.8 °C) (Temporal)   Resp 16   Ht 6' 2.5\" (1.892 m)   Wt (!) 440 lb 12.8 oz (199.9 kg)   SpO2 99%   BMI 55.84 kg/m²   GENERAL: Alert and oriented, well developed, well nourished,in no apparent distress  SKIN: no rashes,no suspicious lesions  HEENT: atraumatic, PERRLA, EOMI, normal lid and conjunctiva, normal external canals and tympanic membranes bilaterally  NECK: supple, no jvd, no thyromegaly, no palpable/tender cervical lymphadenopathy  LUNGS: clear to auscultation bilaterally, no wheezing/rubs  CARDIO: RRR without murmurs.  No clubbing, cyanosis or edema.  GI: soft non tender nondistended no hepatosplenomegaly, bowel sounds throughout  NEURO: CN II-XII intact, 5/5 strength all extremities  MS: Full ROM, no joint pain  PSYCH: pleasant, appropriate mood and affect  ASSESSMENT AND PLAN:   1.  Encounter for preventative adult health examination  2. Need for immunization against influenza  3. Need for diphtheria-tetanus-pertussis (Tdap) vaccine  Age appropriate health guidance and counseling provided.  Screening labs done last week.  Body mass index is 55.84 kg/m².  Focus on lifestyle modification.  Declines flu shot.  TDaP administered in office today.  - TdaP (Adacel, Boostrix) [24983]    4. Prediabetes  Lifestyle controlled.  Lipids mildly elevated as well.  A1c improved - almost normal at 5.7%.  Continue focus on diet/exercise.    5. Elevated liver enzymes  Likely fatty liver.   AST/ALT improved from last year.  Continue focus on lifestyle  modification.    6. Vitamin D deficiency  Insufficiency range.  Recommend daily OTC supplementation, 1000 - 2000 units daily.    Patient Care Team:  Pati Wallace MD as PCP - General (Internal Medicine)  Kush Tineo RD (Dietitian)  Ashley Abbott APRN (Nurse Practitioner Family)  The patient indicates understanding of these issues and agrees to the plan.  The patient is asked to return to clinic in 12 months for follow up on chronic issues/CPX, or earlier if acute issues arise.    Pati Wallace MD             [1]   Allergies  Allergen Reactions    Cephalexin HIVES

## 2025-01-06 NOTE — PATIENT INSTRUCTIONS
- Tetanus shot given today.  You need a booster shot every 10 years.  - Prediabetes, liver enzymes, cholesterol numbers a little high, but overall improved from before.  - Continue focus on healthy diet, regular exercise  - Keep monitoring blood pressures at home, continue with your large cuff. Let us know if you have readings higher than 140 on a regular basis.    It was a pleasure seeing you in the clinic today.  Thank you for choosing the [facility] [place] office for your healthcare needs. Please call at [phone] with any questions or concerns.    Pati Wallace MD

## (undated) NOTE — LETTER
Date & Time: 11/14/2024, 9:41 AM  Patient: Ag Pryor  Encounter Provider(s):    Savanah Davis PA       To Whom It May Concern:    Ag Pryor was seen and treated in our department on 11/14/2024.  Patient will return to work on Monday if feeling better and fever free.    If you have any questions or concerns, please do not hesitate to call.        _____________________________  Physician/APC Signature

## (undated) NOTE — LETTER
[address] [place] [address]  [phone]            March 28, 2020    Patient: Damon Flowers   YOB: 1985   Date of Visit: 3/28/2020       Dear Employer,    At [employer]